# Patient Record
Sex: FEMALE | Race: WHITE | NOT HISPANIC OR LATINO | Employment: FULL TIME | URBAN - METROPOLITAN AREA
[De-identification: names, ages, dates, MRNs, and addresses within clinical notes are randomized per-mention and may not be internally consistent; named-entity substitution may affect disease eponyms.]

---

## 2017-05-02 ENCOUNTER — GENERIC CONVERSION - ENCOUNTER (OUTPATIENT)
Dept: OTHER | Facility: OTHER | Age: 57
End: 2017-05-02

## 2017-05-02 ENCOUNTER — TRANSCRIBE ORDERS (OUTPATIENT)
Dept: SLEEP CENTER | Facility: CLINIC | Age: 57
End: 2017-05-02

## 2017-05-02 DIAGNOSIS — G47.33 OBSTRUCTIVE SLEEP APNEA (ADULT) (PEDIATRIC): Primary | ICD-10-CM

## 2017-05-12 ENCOUNTER — HOSPITAL ENCOUNTER (OUTPATIENT)
Dept: SLEEP CENTER | Facility: CLINIC | Age: 57
Discharge: HOME | End: 2017-05-12
Payer: COMMERCIAL

## 2017-05-12 DIAGNOSIS — G47.33 OBSTRUCTIVE SLEEP APNEA (ADULT) (PEDIATRIC): ICD-10-CM

## 2017-06-02 ENCOUNTER — TRANSCRIBE ORDERS (OUTPATIENT)
Dept: SLEEP CENTER | Facility: CLINIC | Age: 57
End: 2017-06-02

## 2017-06-02 ENCOUNTER — TRANSCRIBE ORDERS (OUTPATIENT)
Dept: ADMINISTRATIVE | Facility: HOSPITAL | Age: 57
End: 2017-06-02

## 2017-06-02 ENCOUNTER — ALLSCRIPTS OFFICE VISIT (OUTPATIENT)
Dept: OTHER | Facility: OTHER | Age: 57
End: 2017-06-02

## 2017-06-02 DIAGNOSIS — E66.01 MORBID (SEVERE) OBESITY DUE TO EXCESS CALORIES (HCC): ICD-10-CM

## 2017-06-02 DIAGNOSIS — E66.01 MORBID OBESITY, UNSPECIFIED OBESITY TYPE (HCC): Primary | ICD-10-CM

## 2017-06-03 ENCOUNTER — TRANSCRIBE ORDERS (OUTPATIENT)
Dept: ADMINISTRATIVE | Facility: HOSPITAL | Age: 57
End: 2017-06-03

## 2017-06-03 ENCOUNTER — APPOINTMENT (OUTPATIENT)
Dept: LAB | Facility: MEDICAL CENTER | Age: 57
End: 2017-06-03
Payer: COMMERCIAL

## 2017-06-03 DIAGNOSIS — E55.9 VITAMIN D DEFICIENCY: ICD-10-CM

## 2017-06-03 DIAGNOSIS — E66.01 MORBID OBESITY, UNSPECIFIED OBESITY TYPE (HCC): Primary | ICD-10-CM

## 2017-06-03 DIAGNOSIS — E03.9 HYPOTHYROIDISM, UNSPECIFIED TYPE: Primary | ICD-10-CM

## 2017-06-03 LAB
25(OH)D3 SERPL-MCNC: 31.6 NG/ML (ref 30–100)
BUN SERPL-MCNC: 20 MG/DL (ref 5–25)
CREAT SERPL-MCNC: 0.72 MG/DL (ref 0.6–1.3)
GFR SERPL CREATININE-BSD FRML MDRD: >60 ML/MIN/1.73SQ M
TSH SERPL DL<=0.05 MIU/L-ACNC: 0.61 UIU/ML (ref 0.36–3.74)

## 2017-06-03 PROCEDURE — 82306 VITAMIN D 25 HYDROXY: CPT | Performed by: FAMILY MEDICINE

## 2017-06-03 PROCEDURE — 82565 ASSAY OF CREATININE: CPT | Performed by: SURGERY

## 2017-06-03 PROCEDURE — 84520 ASSAY OF UREA NITROGEN: CPT | Performed by: SURGERY

## 2017-06-03 PROCEDURE — 84443 ASSAY THYROID STIM HORMONE: CPT | Performed by: FAMILY MEDICINE

## 2017-06-03 PROCEDURE — 36415 COLL VENOUS BLD VENIPUNCTURE: CPT | Performed by: SURGERY

## 2017-06-06 ENCOUNTER — GENERIC CONVERSION - ENCOUNTER (OUTPATIENT)
Dept: OTHER | Facility: OTHER | Age: 57
End: 2017-06-06

## 2017-06-08 ENCOUNTER — HOSPITAL ENCOUNTER (OUTPATIENT)
Dept: RADIOLOGY | Facility: MEDICAL CENTER | Age: 57
Discharge: HOME | End: 2017-06-08
Payer: COMMERCIAL

## 2017-06-08 DIAGNOSIS — E66.01 MORBID (SEVERE) OBESITY DUE TO EXCESS CALORIES (HCC): ICD-10-CM

## 2017-06-08 PROCEDURE — 74177 CT ABD & PELVIS W/CONTRAST: CPT

## 2017-06-08 RX ADMIN — IOHEXOL 100 ML: 350 INJECTION, SOLUTION INTRAVENOUS at 15:03

## 2017-06-14 ENCOUNTER — GENERIC CONVERSION - ENCOUNTER (OUTPATIENT)
Dept: OTHER | Facility: OTHER | Age: 57
End: 2017-06-14

## 2017-06-15 ENCOUNTER — GENERIC CONVERSION - ENCOUNTER (OUTPATIENT)
Dept: OTHER | Facility: OTHER | Age: 57
End: 2017-06-15

## 2017-06-20 ENCOUNTER — HOSPITAL ENCOUNTER (OUTPATIENT)
Dept: SLEEP CENTER | Facility: CLINIC | Age: 57
Discharge: HOME | End: 2017-06-20
Payer: COMMERCIAL

## 2017-06-20 DIAGNOSIS — G47.33 OBSTRUCTIVE SLEEP APNEA (ADULT) (PEDIATRIC): ICD-10-CM

## 2017-06-20 PROCEDURE — 95811 POLYSOM 6/>YRS CPAP 4/> PARM: CPT

## 2017-06-22 ENCOUNTER — GENERIC CONVERSION - ENCOUNTER (OUTPATIENT)
Dept: OTHER | Facility: OTHER | Age: 57
End: 2017-06-22

## 2017-07-06 ENCOUNTER — GENERIC CONVERSION - ENCOUNTER (OUTPATIENT)
Dept: OTHER | Facility: OTHER | Age: 57
End: 2017-07-06

## 2017-07-21 ENCOUNTER — HOSPITAL ENCOUNTER (OUTPATIENT)
Dept: SLEEP CENTER | Facility: CLINIC | Age: 57
Discharge: HOME | End: 2017-07-21
Payer: COMMERCIAL

## 2017-07-21 DIAGNOSIS — G47.33 OBSTRUCTIVE SLEEP APNEA (ADULT) (PEDIATRIC): ICD-10-CM

## 2017-07-31 ENCOUNTER — GENERIC CONVERSION - ENCOUNTER (OUTPATIENT)
Dept: OTHER | Facility: OTHER | Age: 57
End: 2017-07-31

## 2017-07-31 DIAGNOSIS — I10 ESSENTIAL (PRIMARY) HYPERTENSION: ICD-10-CM

## 2017-07-31 DIAGNOSIS — I48.91 ATRIAL FIBRILLATION (HCC): ICD-10-CM

## 2017-07-31 DIAGNOSIS — Z86.39 PERSONAL HISTORY OF OTHER ENDOCRINE, NUTRITIONAL AND METABOLIC DISEASE: ICD-10-CM

## 2017-07-31 DIAGNOSIS — I49.9 CARDIAC ARRHYTHMIA: ICD-10-CM

## 2017-08-02 ENCOUNTER — ANESTHESIA EVENT (OUTPATIENT)
Dept: PERIOP | Facility: HOSPITAL | Age: 57
DRG: 621 | End: 2017-08-02
Payer: COMMERCIAL

## 2017-08-03 ENCOUNTER — ALLSCRIPTS OFFICE VISIT (OUTPATIENT)
Dept: OTHER | Facility: OTHER | Age: 57
End: 2017-08-03

## 2017-08-08 ENCOUNTER — TRANSCRIBE ORDERS (OUTPATIENT)
Dept: LAB | Age: 57
End: 2017-08-08

## 2017-08-08 ENCOUNTER — APPOINTMENT (OUTPATIENT)
Dept: LAB | Age: 57
End: 2017-08-08
Payer: COMMERCIAL

## 2017-08-08 DIAGNOSIS — I48.91 ATRIAL FIBRILLATION (HCC): ICD-10-CM

## 2017-08-08 DIAGNOSIS — Z86.39 PERSONAL HISTORY OF NUTRITIONAL DEFICIENCY: ICD-10-CM

## 2017-08-08 DIAGNOSIS — I49.9 VENTRICULAR ARRHYTHMIA: ICD-10-CM

## 2017-08-08 DIAGNOSIS — I10 ESSENTIAL HYPERTENSION, MALIGNANT: ICD-10-CM

## 2017-08-08 DIAGNOSIS — I48.91 ATRIAL FIBRILLATION, UNSPECIFIED TYPE (HCC): Primary | ICD-10-CM

## 2017-08-08 DIAGNOSIS — I10 ESSENTIAL (PRIMARY) HYPERTENSION: ICD-10-CM

## 2017-08-08 DIAGNOSIS — E66.01 MORBID OBESITY DUE TO EXCESS CALORIES (HCC): ICD-10-CM

## 2017-08-08 DIAGNOSIS — I49.9 CARDIAC ARRHYTHMIA: ICD-10-CM

## 2017-08-08 DIAGNOSIS — Z86.39 PERSONAL HISTORY OF OTHER ENDOCRINE, NUTRITIONAL AND METABOLIC DISEASE: ICD-10-CM

## 2017-08-08 LAB
ALBUMIN SERPL BCP-MCNC: 3.3 G/DL (ref 3.5–5)
ALP SERPL-CCNC: 70 U/L (ref 46–116)
ALT SERPL W P-5'-P-CCNC: 19 U/L (ref 12–78)
ANION GAP SERPL CALCULATED.3IONS-SCNC: 6 MMOL/L (ref 4–13)
AST SERPL W P-5'-P-CCNC: 18 U/L (ref 5–45)
BILIRUB SERPL-MCNC: 0.9 MG/DL (ref 0.2–1)
BUN SERPL-MCNC: 16 MG/DL (ref 5–25)
CALCIUM SERPL-MCNC: 9.6 MG/DL (ref 8.3–10.1)
CHLORIDE SERPL-SCNC: 102 MMOL/L (ref 100–108)
CHOLEST SERPL-MCNC: 180 MG/DL (ref 50–200)
CO2 SERPL-SCNC: 29 MMOL/L (ref 21–32)
CREAT SERPL-MCNC: 0.57 MG/DL (ref 0.6–1.3)
ERYTHROCYTE [DISTWIDTH] IN BLOOD BY AUTOMATED COUNT: 16.5 % (ref 11.6–15.1)
GFR SERPL CREATININE-BSD FRML MDRD: 105 ML/MIN/1.73SQ M
GLUCOSE P FAST SERPL-MCNC: 97 MG/DL (ref 65–99)
HCT VFR BLD AUTO: 43.9 % (ref 34.8–46.1)
HDLC SERPL-MCNC: 49 MG/DL (ref 40–60)
HGB BLD-MCNC: 13.9 G/DL (ref 11.5–15.4)
LDLC SERPL CALC-MCNC: 111 MG/DL (ref 0–100)
MCH RBC QN AUTO: 26.4 PG (ref 26.8–34.3)
MCHC RBC AUTO-ENTMCNC: 31.7 G/DL (ref 31.4–37.4)
MCV RBC AUTO: 84 FL (ref 82–98)
PLATELET # BLD AUTO: 215 THOUSANDS/UL (ref 149–390)
PMV BLD AUTO: 12.1 FL (ref 8.9–12.7)
POTASSIUM SERPL-SCNC: 4.6 MMOL/L (ref 3.5–5.3)
PROT SERPL-MCNC: 8.2 G/DL (ref 6.4–8.2)
RBC # BLD AUTO: 5.26 MILLION/UL (ref 3.81–5.12)
SODIUM SERPL-SCNC: 137 MMOL/L (ref 136–145)
TRIGL SERPL-MCNC: 98 MG/DL
TSH SERPL DL<=0.05 MIU/L-ACNC: 1.62 UIU/ML (ref 0.36–3.74)
WBC # BLD AUTO: 6.98 THOUSAND/UL (ref 4.31–10.16)

## 2017-08-08 PROCEDURE — 85027 COMPLETE CBC AUTOMATED: CPT

## 2017-08-08 PROCEDURE — 80061 LIPID PANEL: CPT

## 2017-08-08 PROCEDURE — 80053 COMPREHEN METABOLIC PANEL: CPT

## 2017-08-08 PROCEDURE — 36415 COLL VENOUS BLD VENIPUNCTURE: CPT

## 2017-08-08 PROCEDURE — 84443 ASSAY THYROID STIM HORMONE: CPT

## 2017-08-09 ENCOUNTER — GENERIC CONVERSION - ENCOUNTER (OUTPATIENT)
Dept: OTHER | Facility: OTHER | Age: 57
End: 2017-08-09

## 2017-08-14 ENCOUNTER — GENERIC CONVERSION - ENCOUNTER (OUTPATIENT)
Dept: OTHER | Facility: OTHER | Age: 57
End: 2017-08-14

## 2017-08-15 ENCOUNTER — GENERIC CONVERSION - ENCOUNTER (OUTPATIENT)
Dept: OTHER | Facility: OTHER | Age: 57
End: 2017-08-15

## 2017-08-21 ENCOUNTER — HOSPITAL ENCOUNTER (INPATIENT)
Facility: HOSPITAL | Age: 57
LOS: 2 days | Discharge: HOME | DRG: 621 | End: 2017-08-23
Attending: SURGERY | Admitting: SURGERY
Payer: COMMERCIAL

## 2017-08-21 ENCOUNTER — ANESTHESIA (OUTPATIENT)
Dept: PERIOP | Facility: HOSPITAL | Age: 57
DRG: 621 | End: 2017-08-21
Payer: COMMERCIAL

## 2017-08-21 DIAGNOSIS — I10 ESSENTIAL (PRIMARY) HYPERTENSION: ICD-10-CM

## 2017-08-21 DIAGNOSIS — E66.01 MORBID (SEVERE) OBESITY DUE TO EXCESS CALORIES (HCC): ICD-10-CM

## 2017-08-21 PROCEDURE — 0DB64Z3 EXCISION OF STOMACH, PERCUTANEOUS ENDOSCOPIC APPROACH, VERTICAL: ICD-10-PCS | Performed by: SURGERY

## 2017-08-21 PROCEDURE — C9113 INJ PANTOPRAZOLE SODIUM, VIA: HCPCS | Performed by: SURGERY

## 2017-08-21 PROCEDURE — 0DJ08ZZ INSPECTION OF UPPER INTESTINAL TRACT, VIA NATURAL OR ARTIFICIAL OPENING ENDOSCOPIC: ICD-10-PCS | Performed by: SURGERY

## 2017-08-21 PROCEDURE — 88307 TISSUE EXAM BY PATHOLOGIST: CPT | Performed by: SURGERY

## 2017-08-21 RX ORDER — PROMETHAZINE HYDROCHLORIDE 25 MG/ML
25 INJECTION, SOLUTION INTRAMUSCULAR; INTRAVENOUS EVERY 6 HOURS PRN
Status: DISCONTINUED | OUTPATIENT
Start: 2017-08-21 | End: 2017-08-23 | Stop reason: HOSPADM

## 2017-08-21 RX ORDER — EPHEDRINE SULFATE 50 MG/ML
INJECTION, SOLUTION INTRAVENOUS AS NEEDED
Status: DISCONTINUED | OUTPATIENT
Start: 2017-08-21 | End: 2017-08-21 | Stop reason: SURG

## 2017-08-21 RX ORDER — PROPOFOL 10 MG/ML
INJECTION, EMULSION INTRAVENOUS AS NEEDED
Status: DISCONTINUED | OUTPATIENT
Start: 2017-08-21 | End: 2017-08-21 | Stop reason: SURG

## 2017-08-21 RX ORDER — MEPERIDINE HYDROCHLORIDE 50 MG/ML
12.5 INJECTION INTRAMUSCULAR; INTRAVENOUS; SUBCUTANEOUS ONCE AS NEEDED
Status: DISCONTINUED | OUTPATIENT
Start: 2017-08-21 | End: 2017-08-21 | Stop reason: HOSPADM

## 2017-08-21 RX ORDER — PANTOPRAZOLE SODIUM 40 MG/1
40 INJECTION, POWDER, FOR SOLUTION INTRAVENOUS
Status: DISCONTINUED | OUTPATIENT
Start: 2017-08-21 | End: 2017-08-23 | Stop reason: HOSPADM

## 2017-08-21 RX ORDER — OXYCODONE HCL 5 MG/5 ML
10 SOLUTION, ORAL ORAL EVERY 4 HOURS PRN
Status: DISCONTINUED | OUTPATIENT
Start: 2017-08-21 | End: 2017-08-23 | Stop reason: HOSPADM

## 2017-08-21 RX ORDER — MAGNESIUM HYDROXIDE 1200 MG/15ML
LIQUID ORAL AS NEEDED
Status: DISCONTINUED | OUTPATIENT
Start: 2017-08-21 | End: 2017-08-21 | Stop reason: HOSPADM

## 2017-08-21 RX ORDER — MORPHINE SULFATE 4 MG/ML
4 INJECTION, SOLUTION INTRAMUSCULAR; INTRAVENOUS EVERY 2 HOUR PRN
Status: DISCONTINUED | OUTPATIENT
Start: 2017-08-21 | End: 2017-08-23 | Stop reason: HOSPADM

## 2017-08-21 RX ORDER — ACETAMINOPHEN 160 MG/5ML
325 SUSPENSION, ORAL (FINAL DOSE FORM) ORAL EVERY 4 HOURS PRN
Status: DISCONTINUED | OUTPATIENT
Start: 2017-08-21 | End: 2017-08-23 | Stop reason: HOSPADM

## 2017-08-21 RX ORDER — BUPIVACAINE HYDROCHLORIDE AND EPINEPHRINE 5; 5 MG/ML; UG/ML
INJECTION, SOLUTION PERINEURAL AS NEEDED
Status: DISCONTINUED | OUTPATIENT
Start: 2017-08-21 | End: 2017-08-21 | Stop reason: HOSPADM

## 2017-08-21 RX ORDER — ACETAMINOPHEN 160 MG/5ML
320 SUSPENSION, ORAL (FINAL DOSE FORM) ORAL EVERY 4 HOURS PRN
Status: DISCONTINUED | OUTPATIENT
Start: 2017-08-21 | End: 2017-08-23 | Stop reason: HOSPADM

## 2017-08-21 RX ORDER — SCOLOPAMINE TRANSDERMAL SYSTEM 1 MG/1
1 PATCH, EXTENDED RELEASE TRANSDERMAL ONCE AS NEEDED
Status: DISCONTINUED | OUTPATIENT
Start: 2017-08-21 | End: 2017-08-21

## 2017-08-21 RX ORDER — SODIUM CHLORIDE 9 MG/ML
125 INJECTION, SOLUTION INTRAVENOUS CONTINUOUS
Status: DISCONTINUED | OUTPATIENT
Start: 2017-08-21 | End: 2017-08-21 | Stop reason: HOSPADM

## 2017-08-21 RX ORDER — ONDANSETRON 2 MG/ML
INJECTION INTRAMUSCULAR; INTRAVENOUS AS NEEDED
Status: DISCONTINUED | OUTPATIENT
Start: 2017-08-21 | End: 2017-08-21 | Stop reason: SURG

## 2017-08-21 RX ORDER — GLYCOPYRROLATE 0.2 MG/ML
INJECTION INTRAMUSCULAR; INTRAVENOUS AS NEEDED
Status: DISCONTINUED | OUTPATIENT
Start: 2017-08-21 | End: 2017-08-21 | Stop reason: SURG

## 2017-08-21 RX ORDER — FENTANYL CITRATE/PF 50 MCG/ML
50 SYRINGE (ML) INJECTION
Status: DISCONTINUED | OUTPATIENT
Start: 2017-08-21 | End: 2017-08-21 | Stop reason: HOSPADM

## 2017-08-21 RX ORDER — DOCUSATE SODIUM 100 MG/1
100 CAPSULE, LIQUID FILLED ORAL 2 TIMES DAILY
COMMUNITY
End: 2018-04-16

## 2017-08-21 RX ORDER — MORPHINE SULFATE 4 MG/ML
2 INJECTION, SOLUTION INTRAMUSCULAR; INTRAVENOUS EVERY 2 HOUR PRN
Status: DISCONTINUED | OUTPATIENT
Start: 2017-08-21 | End: 2017-08-23 | Stop reason: HOSPADM

## 2017-08-21 RX ORDER — SODIUM CHLORIDE, SODIUM LACTATE, POTASSIUM CHLORIDE, CALCIUM CHLORIDE 600; 310; 30; 20 MG/100ML; MG/100ML; MG/100ML; MG/100ML
50 INJECTION, SOLUTION INTRAVENOUS CONTINUOUS
Status: DISCONTINUED | OUTPATIENT
Start: 2017-08-21 | End: 2017-08-23

## 2017-08-21 RX ORDER — ROCURONIUM BROMIDE 10 MG/ML
INJECTION, SOLUTION INTRAVENOUS AS NEEDED
Status: DISCONTINUED | OUTPATIENT
Start: 2017-08-21 | End: 2017-08-21 | Stop reason: SURG

## 2017-08-21 RX ORDER — ONDANSETRON 2 MG/ML
4 INJECTION INTRAMUSCULAR; INTRAVENOUS EVERY 4 HOURS PRN
Status: DISCONTINUED | OUTPATIENT
Start: 2017-08-21 | End: 2017-08-23 | Stop reason: HOSPADM

## 2017-08-21 RX ORDER — MIDAZOLAM HYDROCHLORIDE 1 MG/ML
INJECTION INTRAMUSCULAR; INTRAVENOUS AS NEEDED
Status: DISCONTINUED | OUTPATIENT
Start: 2017-08-21 | End: 2017-08-21 | Stop reason: SURG

## 2017-08-21 RX ORDER — OXYCODONE HCL 5 MG/5 ML
5 SOLUTION, ORAL ORAL EVERY 4 HOURS PRN
Status: DISCONTINUED | OUTPATIENT
Start: 2017-08-21 | End: 2017-08-23 | Stop reason: HOSPADM

## 2017-08-21 RX ORDER — ONDANSETRON 2 MG/ML
4 INJECTION INTRAMUSCULAR; INTRAVENOUS ONCE AS NEEDED
Status: DISCONTINUED | OUTPATIENT
Start: 2017-08-21 | End: 2017-08-21 | Stop reason: HOSPADM

## 2017-08-21 RX ORDER — FENTANYL CITRATE 50 UG/ML
INJECTION, SOLUTION INTRAMUSCULAR; INTRAVENOUS AS NEEDED
Status: DISCONTINUED | OUTPATIENT
Start: 2017-08-21 | End: 2017-08-21 | Stop reason: SURG

## 2017-08-21 RX ORDER — METOPROLOL TARTRATE 5 MG/5ML
5 INJECTION INTRAVENOUS EVERY 6 HOURS
Status: DISCONTINUED | OUTPATIENT
Start: 2017-08-21 | End: 2017-08-23 | Stop reason: HOSPADM

## 2017-08-21 RX ORDER — HYDROMORPHONE HYDROCHLORIDE 2 MG/ML
INJECTION, SOLUTION INTRAMUSCULAR; INTRAVENOUS; SUBCUTANEOUS AS NEEDED
Status: DISCONTINUED | OUTPATIENT
Start: 2017-08-21 | End: 2017-08-21 | Stop reason: SURG

## 2017-08-21 RX ADMIN — ONDANSETRON HYDROCHLORIDE 4 MG: 2 INJECTION, SOLUTION INTRAVENOUS at 13:15

## 2017-08-21 RX ADMIN — SODIUM CHLORIDE, SODIUM LACTATE, POTASSIUM CHLORIDE, AND CALCIUM CHLORIDE 125 ML/HR: .6; .31; .03; .02 INJECTION, SOLUTION INTRAVENOUS at 14:18

## 2017-08-21 RX ADMIN — OXYCODONE HYDROCHLORIDE 10 MG: 5 SOLUTION ORAL at 21:58

## 2017-08-21 RX ADMIN — FENTANYL CITRATE 50 MCG: 50 INJECTION, SOLUTION INTRAMUSCULAR; INTRAVENOUS at 12:50

## 2017-08-21 RX ADMIN — ROCURONIUM BROMIDE 10 MG: 10 INJECTION, SOLUTION INTRAVENOUS at 13:04

## 2017-08-21 RX ADMIN — CEFAZOLIN SODIUM 3000 MG: 2 SOLUTION INTRAVENOUS at 12:23

## 2017-08-21 RX ADMIN — SODIUM CHLORIDE, SODIUM LACTATE, POTASSIUM CHLORIDE, AND CALCIUM CHLORIDE 125 ML/HR: .6; .31; .03; .02 INJECTION, SOLUTION INTRAVENOUS at 17:38

## 2017-08-21 RX ADMIN — MIDAZOLAM HYDROCHLORIDE 2 MG: 1 INJECTION, SOLUTION INTRAMUSCULAR; INTRAVENOUS at 12:00

## 2017-08-21 RX ADMIN — SODIUM CHLORIDE 125 ML/HR: 0.9 INJECTION, SOLUTION INTRAVENOUS at 10:18

## 2017-08-21 RX ADMIN — FENTANYL CITRATE 100 MCG: 50 INJECTION, SOLUTION INTRAMUSCULAR; INTRAVENOUS at 12:09

## 2017-08-21 RX ADMIN — ONDANSETRON 4 MG: 2 INJECTION INTRAMUSCULAR; INTRAVENOUS at 16:55

## 2017-08-21 RX ADMIN — FENTANYL CITRATE 50 MCG: 50 INJECTION INTRAMUSCULAR; INTRAVENOUS at 14:29

## 2017-08-21 RX ADMIN — METOPROLOL TARTRATE 5 MG: 5 INJECTION INTRAVENOUS at 16:54

## 2017-08-21 RX ADMIN — EPHEDRINE SULFATE 5 MG: 50 INJECTION, SOLUTION INTRAMUSCULAR; INTRAVENOUS; SUBCUTANEOUS at 12:42

## 2017-08-21 RX ADMIN — SCOPALAMINE 1 PATCH: 1 PATCH, EXTENDED RELEASE TRANSDERMAL at 10:20

## 2017-08-21 RX ADMIN — DEXAMETHASONE SODIUM PHOSPHATE 4 MG: 10 INJECTION INTRAMUSCULAR; INTRAVENOUS at 12:47

## 2017-08-21 RX ADMIN — HYDROMORPHONE HYDROCHLORIDE 1 MG: 2 INJECTION, SOLUTION INTRAMUSCULAR; INTRAVENOUS; SUBCUTANEOUS at 13:25

## 2017-08-21 RX ADMIN — PROPOFOL 200 MG: 10 INJECTION, EMULSION INTRAVENOUS at 12:09

## 2017-08-21 RX ADMIN — ENOXAPARIN SODIUM 40 MG: 40 INJECTION SUBCUTANEOUS at 11:35

## 2017-08-21 RX ADMIN — ACETAMINOPHEN 325 MG: 160 SUSPENSION ORAL at 21:59

## 2017-08-21 RX ADMIN — ONDANSETRON HYDROCHLORIDE 4 MG: 2 INJECTION, SOLUTION INTRAVENOUS at 12:57

## 2017-08-21 RX ADMIN — EPHEDRINE SULFATE 10 MG: 50 INJECTION, SOLUTION INTRAMUSCULAR; INTRAVENOUS; SUBCUTANEOUS at 12:14

## 2017-08-21 RX ADMIN — HYDROMORPHONE HYDROCHLORIDE 1 MG: 2 INJECTION, SOLUTION INTRAMUSCULAR; INTRAVENOUS; SUBCUTANEOUS at 13:40

## 2017-08-21 RX ADMIN — ROCURONIUM BROMIDE 50 MG: 10 INJECTION, SOLUTION INTRAVENOUS at 12:09

## 2017-08-21 RX ADMIN — GLYCOPYRROLATE 0.4 MG: 0.2 INJECTION, SOLUTION INTRAMUSCULAR; INTRAVENOUS at 13:29

## 2017-08-21 RX ADMIN — NEOSTIGMINE METHYLSULFATE 2.5 MG: 1 INJECTION, SOLUTION INTRAMUSCULAR; INTRAVENOUS; SUBCUTANEOUS at 13:29

## 2017-08-21 RX ADMIN — METOPROLOL TARTRATE 5 MG: 5 INJECTION INTRAVENOUS at 21:52

## 2017-08-21 RX ADMIN — PANTOPRAZOLE SODIUM 40 MG: 40 INJECTION, POWDER, FOR SOLUTION INTRAVENOUS at 16:55

## 2017-08-21 RX ADMIN — FENTANYL CITRATE 50 MCG: 50 INJECTION, SOLUTION INTRAMUSCULAR; INTRAVENOUS at 13:15

## 2017-08-22 LAB
ANION GAP SERPL CALCULATED.3IONS-SCNC: 12 MMOL/L (ref 4–13)
BUN SERPL-MCNC: 13 MG/DL (ref 5–25)
CALCIUM SERPL-MCNC: 9.1 MG/DL (ref 8.3–10.1)
CHLORIDE SERPL-SCNC: 100 MMOL/L (ref 100–108)
CO2 SERPL-SCNC: 26 MMOL/L (ref 21–32)
CREAT SERPL-MCNC: 0.75 MG/DL (ref 0.6–1.3)
ERYTHROCYTE [DISTWIDTH] IN BLOOD BY AUTOMATED COUNT: 16.7 % (ref 11.6–15.1)
GFR SERPL CREATININE-BSD FRML MDRD: 91 ML/MIN/1.73SQ M
GLUCOSE SERPL-MCNC: 104 MG/DL (ref 65–140)
HCT VFR BLD AUTO: 42.9 % (ref 34.8–46.1)
HGB BLD-MCNC: 14 G/DL (ref 11.5–15.4)
MCH RBC QN AUTO: 27.8 PG (ref 26.8–34.3)
MCHC RBC AUTO-ENTMCNC: 32.6 G/DL (ref 31.4–37.4)
MCV RBC AUTO: 85 FL (ref 82–98)
PLATELET # BLD AUTO: 214 THOUSANDS/UL (ref 149–390)
PMV BLD AUTO: 12.9 FL (ref 8.9–12.7)
POTASSIUM SERPL-SCNC: 4.4 MMOL/L (ref 3.5–5.3)
RBC # BLD AUTO: 5.04 MILLION/UL (ref 3.81–5.12)
SODIUM SERPL-SCNC: 138 MMOL/L (ref 136–145)
WBC # BLD AUTO: 8.7 THOUSAND/UL (ref 4.31–10.16)

## 2017-08-22 PROCEDURE — C9113 INJ PANTOPRAZOLE SODIUM, VIA: HCPCS | Performed by: SURGERY

## 2017-08-22 PROCEDURE — 85027 COMPLETE CBC AUTOMATED: CPT | Performed by: SURGERY

## 2017-08-22 PROCEDURE — 80048 BASIC METABOLIC PNL TOTAL CA: CPT | Performed by: SURGERY

## 2017-08-22 RX ORDER — METOCLOPRAMIDE HYDROCHLORIDE 5 MG/ML
10 INJECTION INTRAMUSCULAR; INTRAVENOUS EVERY 6 HOURS SCHEDULED
Status: DISCONTINUED | OUTPATIENT
Start: 2017-08-22 | End: 2017-08-23 | Stop reason: HOSPADM

## 2017-08-22 RX ORDER — METOCLOPRAMIDE HYDROCHLORIDE 5 MG/ML
5 INJECTION INTRAMUSCULAR; INTRAVENOUS EVERY 6 HOURS SCHEDULED
Status: DISCONTINUED | OUTPATIENT
Start: 2017-08-22 | End: 2017-08-22

## 2017-08-22 RX ADMIN — ACETAMINOPHEN 325 MG: 160 SUSPENSION ORAL at 02:30

## 2017-08-22 RX ADMIN — SODIUM CHLORIDE, SODIUM LACTATE, POTASSIUM CHLORIDE, AND CALCIUM CHLORIDE 50 ML/HR: .6; .31; .03; .02 INJECTION, SOLUTION INTRAVENOUS at 10:46

## 2017-08-22 RX ADMIN — OXYCODONE HYDROCHLORIDE 10 MG: 5 SOLUTION ORAL at 02:29

## 2017-08-22 RX ADMIN — SODIUM CHLORIDE, SODIUM LACTATE, POTASSIUM CHLORIDE, AND CALCIUM CHLORIDE 125 ML/HR: .6; .31; .03; .02 INJECTION, SOLUTION INTRAVENOUS at 00:59

## 2017-08-22 RX ADMIN — METOPROLOL TARTRATE 5 MG: 5 INJECTION INTRAVENOUS at 16:45

## 2017-08-22 RX ADMIN — ONDANSETRON 4 MG: 2 INJECTION INTRAMUSCULAR; INTRAVENOUS at 00:48

## 2017-08-22 RX ADMIN — OXYCODONE HYDROCHLORIDE 10 MG: 5 SOLUTION ORAL at 08:12

## 2017-08-22 RX ADMIN — ACETAMINOPHEN 325 MG: 160 SUSPENSION ORAL at 08:12

## 2017-08-22 RX ADMIN — ENOXAPARIN SODIUM 40 MG: 40 INJECTION SUBCUTANEOUS at 08:16

## 2017-08-22 RX ADMIN — METOPROLOL TARTRATE 5 MG: 5 INJECTION INTRAVENOUS at 08:15

## 2017-08-22 RX ADMIN — ONDANSETRON 4 MG: 2 INJECTION INTRAMUSCULAR; INTRAVENOUS at 16:45

## 2017-08-22 RX ADMIN — METOCLOPRAMIDE HYDROCHLORIDE 10 MG: 5 INJECTION INTRAMUSCULAR; INTRAVENOUS at 17:58

## 2017-08-22 RX ADMIN — PANTOPRAZOLE SODIUM 40 MG: 40 INJECTION, POWDER, FOR SOLUTION INTRAVENOUS at 08:14

## 2017-08-22 RX ADMIN — METOCLOPRAMIDE HYDROCHLORIDE 10 MG: 5 INJECTION INTRAMUSCULAR; INTRAVENOUS at 23:15

## 2017-08-22 RX ADMIN — METOCLOPRAMIDE 5 MG: 5 INJECTION, SOLUTION INTRAMUSCULAR; INTRAVENOUS at 11:34

## 2017-08-22 RX ADMIN — METOPROLOL TARTRATE 5 MG: 5 INJECTION INTRAVENOUS at 21:32

## 2017-08-22 RX ADMIN — METOPROLOL TARTRATE 5 MG: 5 INJECTION INTRAVENOUS at 03:09

## 2017-08-22 RX ADMIN — METOCLOPRAMIDE 5 MG: 5 INJECTION, SOLUTION INTRAMUSCULAR; INTRAVENOUS at 08:00

## 2017-08-23 VITALS
HEIGHT: 62 IN | WEIGHT: 292.8 LBS | SYSTOLIC BLOOD PRESSURE: 141 MMHG | BODY MASS INDEX: 53.88 KG/M2 | DIASTOLIC BLOOD PRESSURE: 63 MMHG | TEMPERATURE: 97.8 F | OXYGEN SATURATION: 93 % | HEART RATE: 76 BPM | RESPIRATION RATE: 16 BRPM

## 2017-08-23 PROCEDURE — C9113 INJ PANTOPRAZOLE SODIUM, VIA: HCPCS | Performed by: SURGERY

## 2017-08-23 RX ORDER — ONDANSETRON 4 MG/1
4 TABLET, ORALLY DISINTEGRATING ORAL EVERY 6 HOURS PRN
Qty: 10 TABLET | Refills: 0 | Status: SHIPPED | OUTPATIENT
Start: 2017-08-23 | End: 2018-02-01 | Stop reason: ALTCHOICE

## 2017-08-23 RX ADMIN — PANTOPRAZOLE SODIUM 40 MG: 40 INJECTION, POWDER, FOR SOLUTION INTRAVENOUS at 08:29

## 2017-08-23 RX ADMIN — METOCLOPRAMIDE HYDROCHLORIDE 10 MG: 5 INJECTION INTRAMUSCULAR; INTRAVENOUS at 05:11

## 2017-08-23 RX ADMIN — METOPROLOL TARTRATE 5 MG: 5 INJECTION INTRAVENOUS at 03:34

## 2017-08-23 RX ADMIN — ENOXAPARIN SODIUM 40 MG: 40 INJECTION SUBCUTANEOUS at 08:29

## 2017-08-23 RX ADMIN — METOPROLOL TARTRATE 5 MG: 5 INJECTION INTRAVENOUS at 08:28

## 2017-08-23 RX ADMIN — METOCLOPRAMIDE HYDROCHLORIDE 10 MG: 5 INJECTION INTRAMUSCULAR; INTRAVENOUS at 11:10

## 2017-08-23 RX ADMIN — SODIUM CHLORIDE, SODIUM LACTATE, POTASSIUM CHLORIDE, AND CALCIUM CHLORIDE 50 ML/HR: .6; .31; .03; .02 INJECTION, SOLUTION INTRAVENOUS at 06:49

## 2017-08-25 ENCOUNTER — GENERIC CONVERSION - ENCOUNTER (OUTPATIENT)
Dept: OTHER | Facility: OTHER | Age: 57
End: 2017-08-25

## 2017-08-31 ENCOUNTER — GENERIC CONVERSION - ENCOUNTER (OUTPATIENT)
Dept: OTHER | Facility: OTHER | Age: 57
End: 2017-08-31

## 2017-08-31 ENCOUNTER — ALLSCRIPTS OFFICE VISIT (OUTPATIENT)
Dept: OTHER | Facility: OTHER | Age: 57
End: 2017-08-31

## 2017-09-14 ENCOUNTER — GENERIC CONVERSION - ENCOUNTER (OUTPATIENT)
Dept: OTHER | Facility: OTHER | Age: 57
End: 2017-09-14

## 2017-09-22 ENCOUNTER — HOSPITAL ENCOUNTER (OUTPATIENT)
Dept: SLEEP CENTER | Facility: CLINIC | Age: 57
Discharge: HOME | End: 2017-09-22
Payer: COMMERCIAL

## 2017-09-22 DIAGNOSIS — G47.33 OBSTRUCTIVE SLEEP APNEA (ADULT) (PEDIATRIC): ICD-10-CM

## 2017-10-04 ENCOUNTER — GENERIC CONVERSION - ENCOUNTER (OUTPATIENT)
Dept: OTHER | Facility: OTHER | Age: 57
End: 2017-10-04

## 2017-11-30 ENCOUNTER — ALLSCRIPTS OFFICE VISIT (OUTPATIENT)
Dept: OTHER | Facility: OTHER | Age: 57
End: 2017-11-30

## 2017-11-30 DIAGNOSIS — I10 ESSENTIAL (PRIMARY) HYPERTENSION: ICD-10-CM

## 2017-11-30 DIAGNOSIS — Z98.84 BARIATRIC SURGERY STATUS: ICD-10-CM

## 2017-11-30 DIAGNOSIS — K91.2 POSTSURGICAL MALABSORPTION, NOT ELSEWHERE CLASSIFIED (CODE): ICD-10-CM

## 2017-11-30 DIAGNOSIS — E66.01 MORBID (SEVERE) OBESITY DUE TO EXCESS CALORIES (HCC): ICD-10-CM

## 2017-12-01 ENCOUNTER — GENERIC CONVERSION - ENCOUNTER (OUTPATIENT)
Dept: OTHER | Facility: OTHER | Age: 57
End: 2017-12-01

## 2017-12-05 NOTE — PROGRESS NOTES
Assessment    1  Status post laparoscopic sleeve gastrectomy (V45 86) (Z98 84)   2  Postgastrectomy malabsorption (579 3) (K91 2,Z90 3)   3  Benign essential hypertension (401 1) (I10)   4  Morbid obesity (278 01) (E66 01)   5  Cholelithiasis (574 20) (K80 20)   6  Right upper quadrant abdominal pain (789 01) (R10 11)    Plan  Benign essential hypertension, Morbid obesity, Postgastrectomy malabsorption, Status  post laparoscopic sleeve gastrectomy    · (1) CBC/ PLT (NO DIFF); Status:Active; Requested for:30Nov2017;    Perform:[facility]; Due:30Nov2018; Ordered; For:Benign essential hypertension, Morbid obesity, Postgastrectomy malabsorption, Status post laparoscopic sleeve gastrectomy; Ordered By:Halima Keenan;   · (1) COMPREHENSIVE METABOLIC PANEL; Status:Active; Requested for:30Nov2017;    Perform:[facility]; Due:30Nov2018; Ordered; For:Benign essential hypertension, Morbid obesity, Postgastrectomy malabsorption, Status post laparoscopic sleeve gastrectomy; Ordered By:Halima Keenan;   · (1) FERRITIN; Status:Active; Requested for:30Nov2017;    Perform:[facility]; Due:30Nov2018; Ordered; For:Benign essential hypertension, Morbid obesity, Postgastrectomy malabsorption, Status post laparoscopic sleeve gastrectomy; Ordered By:Halima Keenan;   · (1) FOLATE; Status:Active; Requested for:30Nov2017;    Perform:[facility]; Due:30Nov2018; Ordered; For:Benign essential hypertension, Morbid obesity, Postgastrectomy malabsorption, Status post laparoscopic sleeve gastrectomy; Ordered By:Halima Keenan;   · (1) LIPID PANEL, FASTING; Status:Active; Requested for:30Nov2017;    Perform:[facility]; Due:30Nov2018; Ordered; For:Benign essential hypertension, Morbid obesity, Postgastrectomy malabsorption, Status post laparoscopic sleeve gastrectomy; Ordered By:Halima Keenan;   · (1) PTH N-TERMINAL (INTACT); Status:Active;  Requested for:30Nov2017; Perform:[facility]; Due:30Nov2018; Ordered; For:Benign essential hypertension, Morbid obesity, Postgastrectomy malabsorption, Status post laparoscopic sleeve gastrectomy; Ordered By:Halima Keenan;   · (1) VITAMIN A; Status:Active; Requested for:30Nov2017;    Perform:[facility]; Due:30Nov2018; Ordered; For:Benign essential hypertension, Morbid obesity, Postgastrectomy malabsorption, Status post laparoscopic sleeve gastrectomy; Ordered By:Halima Keenan;   · (1) VITAMIN B1, WHOLE BLOOD; Status:Active; Requested for:30Nov2017;    Perform:[facility]; Due:30Nov2018; Ordered; For:Benign essential hypertension, Morbid obesity, Postgastrectomy malabsorption, Status post laparoscopic sleeve gastrectomy; Ordered By:Halima Keenan;   · (1) VITAMIN B12; Status:Active; Requested for:30Nov2017;    Perform:[facility]; Due:30Nov2018; Ordered; For:Benign essential hypertension, Morbid obesity, Postgastrectomy malabsorption, Status post laparoscopic sleeve gastrectomy; Ordered By:Halima Keenan;   · (1) VITAMIN D 25-HYDROXY; Status:Active; Requested for:30Nov2017;    Perform:[facility]; Due:30Nov2018; Ordered; For:Benign essential hypertension, Morbid obesity, Postgastrectomy malabsorption, Status post laparoscopic sleeve gastrectomy; Ordered By:Halima Keenan;    Discussion/Summary    Get ultrasound of gallbladder done [id number] with Dr Tanna Ontiveros to review/ discuss possible gallbladder surgery  Follow up in 3 months for routine visit with me    Follow diet as discussed  Get lab work done prior to your next office visit  Follow vitamin/mineral recommendations as reviewed with you  Exercise as tolerated  Call our office if you have any problems with worsening abdominal pain especially if associated with fever, chills, nausea, vomiting, or any other concerns  1 s/p lap sleeve gastrectomy /morbid obesity/bmi 49 2  RUQ abdominal pain 3  Cholelithiasis    Claudia Cloud is 49year old female Status post laparoscopic sleeve gastrectomy by Dr Pancho Magana 8/21/2017  Her past surgical history was complicated with open cholecystectomy October 2016 for a gangrenous gallbladder  Procedure was noted to be complicated by a broken J P drain which required re-exploration to remove a piece of the j p drain  She also had developed what seemed to be a biloma and had to undergo a percutaneous drain at [facility]    Patient is here for routine follow-up  She is complaining of more frequent right upper quadrant pain/'ache" which occurs after meals-particularly after a higher fat meal which is consistent with biliary dyskinesia  She notes she has this most days of the week pain is relatively mild per patient but can last for several minutes to an hour at a time    she has known cholelithiasis by CT of abdomen/pelvis-with at least one calcified stone and several smaller ones suspected toward the neck    She denies nausea/vomiting fever or chills  she does have + tenderness to palpation to right upper quadrant without rebound or guarding and negative gale's sign      Is eating a regular diet  Is eating at least 60 grams of protein per day  following 30/60 minute rule with liquids  drinking at least 64 ounces of fluid per day  Has had limited exercise secondary to joint pains-encouraged exercise in short intervals a couple times per day as tolerated  I encouraged to review with PCP to see if she needs further evaluation for this or if she would be eligible for PT  She has lost 32% excess body weight loss which is below average but her starting bmi was 60 so expect weight loss to be slower      I advised patient to keep diet low in fat for now  Reviewed her case with Dr Pancho Magana and plan is for her to get Ultrasound of gallbladder to better evaluate duct  She will have follow-up with Dr Pancho Magana after ultrasound is completed    After evaluation of gallbladder I encouraged her to consider follow-up with RD to keep her on track with diet as well    4  Malabsorption- pateint is at risk for malabsorption of vitamins/minerals secondary to malabsorption from her procedure and restriction of intakes  Reviewed current supplements and advised on same-she is taking appropriate bariatric vitamins-due for routine labs-ordering them now    5  HTN-controlled on medication/followed by pcp-will monitor in follow-up visits as well  The patient has the current Goals: Resolve abdominal pain  Continued weight loss with good nutrition intakes  Normal vitamin/mineral levels  Exercise as toleratedThe patent has the current Barriers: Limited exercise with joint pains  Patient is able to Self-Care  Educational resources provided: N/a  Possible side effects of new medications were reviewed with the patient/guardian today  The treatment plan was reviewed with the patient/guardian  The patient/guardian understands and agrees with the treatment plan   She was advised to follow up due to malabsorption risks  Self Referrals: No      Chief Complaint  Patient in office today for 2nd post op visit  She is having issues with abdominal pain and would like to know a more affordable option for vitamins  She is walking  Post-Op  Post-Op Bariatric Surgery:   Tanvi Simpson is status post lap sleeve procedure, performed on 8/21/2017   by Dr Olayinka Nieves  HPI: today's weight is 269 5 lb pounds, today's BMI is 49 and her total weight loss is 32% excess body weight loss pounds  The patient reports no nausea, no vomiting, no constipation, no diarrhea, no chest pain and no abdominal pain  Diet and Exercise: Diet history reviewed and discussed with the patient  Weight loss/gains to date discussed with the patient  Supplements: multivitamins and calcium  PE:   Abdominal exam: soft and no incisional hernia  Assessment:   Post-op, the patient is progressing slowly  Plan:    Activity restrictions: recommend short intevals of walking/exercise as tolerated up to a couple times per day  Instructions / Recommendations: dietary counseling recommended, recommended a daily protein intake of  grams, vitamin supplement(s) recommended, mineral supplement(s) recommended, diet as discussed and instructed to call the office for concerns, questions, or problems  The patient was instructed to follow up in 3 months  Review of Systems    Constitutional: planned weight loss, but no fever and not feeling poorly  Cardiovascular: no chest pain and no palpitations  Respiratory: no shortness of breath and no wheezing  Gastrointestinal: abdominal pain, but no nausea, no vomiting, no constipation and no diarrhea  Musculoskeletal: arthralgias  Psychiatric: no anxiety and no depression  Active Problems    1  Atrial fibrillation (427 31) (I48 91)   2  Benign essential hypertension (401 1) (I10)   3  Bladder infection, acute (595 0) (N30 00)   4  History of underactive thyroid (V12 29) (Z86 39)   5  Hypothyroid (244 9) (E03 9)   6  Irregular heart beat (427 9) (I49 9)   7  Morbid obesity (278 01) (E66 01)   8  Postgastrectomy malabsorption (579 3) (K91 2,Z90 3)   9  Status post laparoscopic sleeve gastrectomy (V45 86) (Z98 84)    Social History    · Never a smoker   · No alcohol use   · No illicit drug use  The social history was reviewed and updated today  Current Meds   1  Calcium Citrate Chewy Bite CHEW;   Therapy: (Recorded:16Fjq9932) to Recorded   2  Coreg 25 MG Oral Tablet; Therapy: (Recorded:15Yvi2094) to Recorded   3  Daily Multiple Vitamins TABS; Therapy: (Recorded:02Jun2017) to Recorded   4  Eliquis 5 MG Oral Tablet; Therapy: (Recorded:97Xvw0870) to Recorded   5  Levothyroxine Sodium 200 MCG Oral Tablet; Therapy: (Recorded:08Rva9538) to Recorded   6  Levothyroxine Sodium 50 MCG Oral Tablet; TAKE 1 TABLET DAILY; Therapy: (Recorded:77Nyv0850) to Recorded   7   Omeprazole 20 MG Oral Capsule Delayed Release; TAKE 1 CAPSULE DAILY EVERY   MORNING BEFORE BREAKFAST; Therapy: 31Duc2545 to (Daniel Nathan)  Requested for: 89Mgz8225; Last   Rx:18Msf7209 Ordered   8  Probiotic Oral Capsule; Therapy: (Recorded:02May2017) to Recorded   9  TraMADol HCl - 50 MG Oral Tablet; Therapy: (Recorded:02May2017) to Recorded    The medication list was reviewed and updated today  Allergies    1  No Known Drug Allergies    Vitals   Recorded: 58SYU2846 09:26AM   Temperature 98 F   Heart Rate 80   Respiration 18   Systolic 500   Diastolic 66   Height 5 ft 2 25 in   Weight 269 lb 8 0 oz   BMI Calculated 48 89   BSA Calculated 2 18     Physical Exam    Constitutional   General appearance: No acute distress, well appearing and well nourished  morbidly obese  Ears, Nose, Mouth, and Throat mucous membranes moist    Pulmonary   Respiratory effort: No increased work of breathing or signs of respiratory distress  Auscultation of lungs: Clear to auscultation  Cardiovascular   Auscultation of heart: Normal rate and rhythm, normal S1 and S2, without murmurs  Abdomen soft, +BS, +tenderness to palpation to Right upper quadrant with no rebound or guarding, negative gale's sign, no incisional hernias appreciated  Musculoskeletal   Gait and station: Normal     Psychiatric   Orientation to person, place, and time: Normal     Mood and affect: Normal          Future Appointments    Date/Time Provider Specialty Site   12/21/2017 02:30 GONZALO Sebastian   General Surgery [facility]   03/07/2018 09:30 AM Juliette Keenan [facility] General Surgery [facility]     Signatures   Electronically signed by : Angela Law [facility]; Nov 30 2017  6:09PM EST                       (Author)

## 2017-12-06 ENCOUNTER — HOSPITAL ENCOUNTER (OUTPATIENT)
Dept: RADIOLOGY | Facility: MEDICAL CENTER | Age: 57
Discharge: HOME | End: 2017-12-06
Payer: COMMERCIAL

## 2017-12-06 DIAGNOSIS — Z98.84 BARIATRIC SURGERY STATUS: ICD-10-CM

## 2017-12-06 PROCEDURE — 76705 ECHO EXAM OF ABDOMEN: CPT

## 2017-12-12 ENCOUNTER — GENERIC CONVERSION - ENCOUNTER (OUTPATIENT)
Dept: OTHER | Facility: OTHER | Age: 57
End: 2017-12-12

## 2017-12-21 ENCOUNTER — ALLSCRIPTS OFFICE VISIT (OUTPATIENT)
Dept: OTHER | Facility: OTHER | Age: 57
End: 2017-12-21

## 2017-12-21 ENCOUNTER — TRANSCRIBE ORDERS (OUTPATIENT)
Dept: ADMINISTRATIVE | Facility: HOSPITAL | Age: 57
End: 2017-12-21

## 2017-12-21 DIAGNOSIS — E66.01 MORBID OBESITY (HCC): Primary | ICD-10-CM

## 2017-12-21 DIAGNOSIS — R10.11 ABDOMINAL PAIN, RIGHT UPPER QUADRANT: ICD-10-CM

## 2017-12-21 DIAGNOSIS — Z98.84 BARIATRIC SURGERY STATUS: ICD-10-CM

## 2017-12-22 NOTE — PROGRESS NOTES
Assessment   1  Cholelithiasis (574 20) (K80 20)   2  Morbid obesity (278 01) (E66 01)    Plan   Morbid obesity, Right upper quadrant abdominal pain, Status post laparoscopic sleeve    gastrectomy    · * MRI ABDOMEN W WO CONTRAST AND MRCP; Status:Need Information - Financial    Authorization,Retrospective By Protocol Authorization; Requested for:[id number];    Perform:[facility]; ZMI:[id number]; Last Updated By:Felipe Serna; 12/21/2017 3:04:23 PM;Ordered; For:Morbid obesity, Right upper quadrant abdominal pain, Status post laparoscopic sleeve gastrectomy; Ordered By:Darius Caballero;    Discussion/Summary      Patient is returning to the office complaining of right upper quadrant pain radiating to the back, her ultrasound showed evidence of gallbladder stones and dilated common bile duct however the patient has history of open cholecystectomy and the exploration because of the LEON drain that apparently broke during the removal  I will start the patient workup by ordering an MRCP  I will see the patient again after the MRCP is performed  Chief Complaint   Patient in office today to discuss gallbladder  Active Problems   1  Atrial fibrillation (427 31) (I48 91)   2  Benign essential hypertension (401 1) (I10)   3  Bladder infection, acute (595 0) (N30 00)   4  Cholelithiasis (574 20) (K80 20)   5  History of underactive thyroid (V12 29) (Z86 39)   6  Hypothyroid (244 9) (E03 9)   7  Irregular heart beat (427 9) (I49 9)   8  Morbid obesity (278 01) (E66 01)   9  Postgastrectomy malabsorption (579 3) (K91 2,Z90 3)   10  Right upper quadrant abdominal pain (789 01) (R10 11)   11  Status post laparoscopic sleeve gastrectomy (V45 86) (Z98 84)    Social History    · Never a smoker   · No alcohol use   · No illicit drug use    Current Meds    1  Calcium Citrate Chewy Bite CHEW;     Therapy: (Recorded:69Gjw9700) to Recorded   2  Coreg 25 MG Oral Tablet; Therapy: (Recorded:64Zqe7180) to Recorded   3   Daily Multiple Vitamins TABS; Therapy: (Recorded:02Jun2017) to Recorded   4  Eliquis 5 MG Oral Tablet; Therapy: (Recorded:78Upx1644) to Recorded   5  Levothyroxine Sodium 200 MCG Oral Tablet; Therapy: (Recorded:31Aug2017) to Recorded   6  Levothyroxine Sodium 50 MCG Oral Tablet; TAKE 1 TABLET DAILY; Therapy: (Recorded:31Aug2017) to Recorded   7  Omeprazole 20 MG Oral Capsule Delayed Release; TAKE 1 CAPSULE DAILY EVERY     MORNING BEFORE BREAKFAST; Therapy: 03Aug2017 to ([employer])  Requested for: 45ZTT2291; Last     Rx:05Dec2017; Status: ACTIVE - Retrospective By Protocol Authorization Ordered   8  Probiotic Oral Capsule; Therapy: (Recorded:02May2017) to Recorded   9  TraMADol HCl - 50 MG Oral Tablet; Therapy: (Recorded:02May2017) to Recorded    Allergies   1  No Known Drug Allergies    Vitals    Recorded: 21Dec2017 02:23PM   Temperature 98 F   Heart Rate 78   Respiration 18   Systolic 197   Diastolic 66   Height 5 ft 2 25 in   Weight 265 lb    BMI Calculated 48 08   BSA Calculated 2 16     Physical Exam        Constitutional      General appearance: No acute distress, well appearing and well nourished  Abdomen      Abdomen: Non-tender, no masses  Liver and spleen: No hepatomegaly or splenomegaly  Future Appointments      Date/Time Provider Specialty Site   03/07/2018 09:30 AM Clari Keenan, [facility] General Surgery [facility]     Signatures    Electronically signed by :  GONZALO Sow ; Dec 21 2017  3:12PM EST                       (Author)

## 2017-12-26 ENCOUNTER — GENERIC CONVERSION - ENCOUNTER (OUTPATIENT)
Dept: OTHER | Facility: OTHER | Age: 57
End: 2017-12-26

## 2018-01-02 ENCOUNTER — GENERIC CONVERSION - ENCOUNTER (OUTPATIENT)
Dept: OTHER | Facility: OTHER | Age: 58
End: 2018-01-02

## 2018-01-09 ENCOUNTER — HOSPITAL ENCOUNTER (OUTPATIENT)
Dept: MRI IMAGING | Facility: HOSPITAL | Age: 58
Discharge: HOME | End: 2018-01-09
Attending: SURGERY
Payer: COMMERCIAL

## 2018-01-09 DIAGNOSIS — R10.11 RIGHT UPPER QUADRANT PAIN: ICD-10-CM

## 2018-01-09 DIAGNOSIS — E66.01 MORBID (SEVERE) OBESITY DUE TO EXCESS CALORIES (HCC): ICD-10-CM

## 2018-01-09 DIAGNOSIS — Z98.84 BARIATRIC SURGERY STATUS: ICD-10-CM

## 2018-01-09 PROCEDURE — A9585 GADOBUTROL INJECTION: HCPCS | Performed by: SURGERY

## 2018-01-09 PROCEDURE — 74183 MRI ABD W/O CNTR FLWD CNTR: CPT

## 2018-01-09 RX ADMIN — GADOBUTROL 11 ML: 604.72 INJECTION INTRAVENOUS at 21:09

## 2018-01-09 NOTE — PROGRESS NOTES
Message  Outreach phone call  No response but left message  How is patient coming along with protein diet? Any? or concerns? Contact information provided> [place]      Active Problems    1  Atrial fibrillation (427 31) (I48 91)   2  Benign essential hypertension (401 1) (I10)   3  History of underactive thyroid (V12 29) (Z86 39)   4  Irregular heart beat (427 9) (I49 9)   5  Morbid obesity (278 01) (E66 01)    Current Meds   1  Coreg 25 MG Oral Tablet (Carvedilol); Therapy: (Recorded:02May2017) to Recorded   2  Daily Multiple Vitamins TABS; Therapy: (Recorded:02Jun2017) to Recorded   3  Eliquis 5 MG Oral Tablet; Therapy: (Recorded:02May2017) to Recorded   4  Levothyroxine Sodium 200 MCG Oral Tablet; Therapy: (Recorded:02May2017) to Recorded   5  Lisinopril 10 MG Oral Tablet; Therapy: (Recorded:02May2017) to Recorded   6  Omeprazole 20 MG Oral Capsule Delayed Release; TAKE 1 CAPSULE DAILY EVERY   MORNING BEFORE BREAKFAST; Therapy: 03Aug2017 to (Pastor Hughes)  Requested for: 08Aug2017; Last   Rx:03Aug2017 Ordered   7  Oxycodone-Acetaminophen 5-325 MG Oral Tablet (Percocet); TAKE 1 TABLET EVERY 4   TO 6 HOURS AS NEEDED FOR PAIN;   Therapy: 01Aug2017 to (Evaluate:06Aug2017); Last Rx:01Aug2017 Ordered   8  Probiotic Oral Capsule; Therapy: (Recorded:02May2017) to Recorded   9  TraMADol HCl - 50 MG Oral Tablet; Therapy: (Recorded:02May2017) to Recorded   10  Vitamin D3 2000 UNIT Oral Capsule; Therapy: (Recorded:02May2017) to Recorded    Allergies    1   No Known Drug Allergies    Signatures   Electronically signed by : DOMINICK Cedeno; Aug 15 2017  2:00PM EST                       (Author)

## 2018-01-10 NOTE — PROGRESS NOTES
Discussion/Summary  Discussion Summary:   Reviewed post-operative pureed and soft foods diet with pt  Discussed gradual diet advancement and portion size progression  Discussed adequate hydration, signs and symptoms of dehydration  Discussed recommended post-operative vitamin supplementation and protein supplements  Discussed importance of regular physical activity  Provided pt with contact information for future questions/concerns  Active Problems    1  Atrial fibrillation (427 31) (I48 91)   2  Benign essential hypertension (401 1) (I10)   3  Bladder infection, acute (595 0) (N30 00)   4  History of underactive thyroid (V12 29) (Z86 39)   5  Hypothyroid (244 9) (E03 9)   6  Irregular heart beat (427 9) (I49 9)   7  Morbid obesity (278 01) (E66 01)   8  Postgastrectomy malabsorption (579 3) (K91 2,Z90 3)   9  Status post laparoscopic sleeve gastrectomy (V45 86) (B20 98)    Past Medical History    1  History of renal calculi (V13 01) (B70 285)    Surgical History    1  History of Cholecystectomy   2  History of Gastrectomy Sleeve    Family History  Mother    1  Family history of    2  Family history of diabetes mellitus (V18 0) (Z83 3)   3  Family history of hypertension (V17 49) (Z82 49)  Father    4  Family history of     Social History    · Never a smoker   · No alcohol use   · No illicit drug use    Current Meds   1  Calcium Citrate Chewy Bite CHEW;   Therapy: (Recorded:88Iwu6467) to Recorded   2  Coreg 25 MG Oral Tablet (Carvedilol); Therapy: (Recorded:66Wbz8032) to Recorded   3  Daily Multiple Vitamins TABS; Therapy: (Recorded:2017) to Recorded   4  Eliquis 5 MG Oral Tablet; Therapy: (Recorded:09Per1756) to Recorded   5  Levothyroxine Sodium 200 MCG Oral Tablet; Therapy: (Recorded:00Sxy5533) to Recorded   6  Levothyroxine Sodium 50 MCG Oral Tablet; TAKE 1 TABLET DAILY; Therapy: (Recorded:19Jwk6679) to Recorded   7  Macrobid CAPS (Nitrofurantoin Monohyd Macro);    Therapy: (Recorded:48Rhn3804) to Recorded   8  Omeprazole 20 MG Oral Capsule Delayed Release; TAKE 1 CAPSULE DAILY EVERY   MORNING BEFORE BREAKFAST; Therapy: 03Aug2017 to (Kendall Charles)  Requested for: 08Aug2017; Last   Rx:03Aug2017 Ordered   9  Probiotic Oral Capsule; Therapy: (Recorded:02May2017) to Recorded   10  TraMADol HCl - 50 MG Oral Tablet; Therapy: (Recorded:02May2017) to Recorded   11  Vitamin D3 2000 UNIT Oral Capsule; Therapy: (Recorded:02May2017) to Recorded    Allergies    1  No Known Drug Allergies    Future Appointments    Date/Time Provider Specialty Site   11/30/2017 09:30 AM Bonnie Jenkins [facility] General Surgery [facility]   10/04/2017 03:00 PM Pete Lesches, LD, RD Dietitian [facility]     Signatures   Electronically signed by : KAIDEN Sandoval; Aug 31 2017 11:26AM EST                       (Author)    Electronically signed by :  GONZALO Jones ; Sep 11 2017 11:16AM EST

## 2018-01-10 NOTE — RESULT NOTES
Dear Joann Hussein,   Your test results have returned and are listed below:      Discussion/Summary  Your results have some minor abnormalities, but nothing that is concerning  Your next office appointment is scheduled for August 31, 2017 at 10:00am       If you have any questions, please don't hesitate to call the office  Sincerely,      Signatures   Electronically signed by : GUNNAR Campbell RD; Aug  9 2017  9:17AM EST                       (Author)    Electronically signed by :  GONZALO Montero ; Aug 16 2017  3:53PM EST

## 2018-01-11 NOTE — PROGRESS NOTES
Active Problems    1  Atrial fibrillation (427 31) (I48 91)   2  Benign essential hypertension (401 1) (I10)   3  Bladder infection, acute (595 0) (N30 00)   4  History of underactive thyroid (V12 29) (Z86 39)   5  Hypothyroid (244 9) (E03 9)   6  Irregular heart beat (427 9) (I49 9)   7  Morbid obesity (278 01) (E66 01)   8  Postgastrectomy malabsorption (579 3) (K91 2,Z90 3)   9  Status post laparoscopic sleeve gastrectomy (V45 86) (Z98 84)    Current Meds   1  Calcium Citrate Chewy Bite CHEW;   Therapy: (Recorded:31Aug2017) to Recorded   2  Coreg 25 MG Oral Tablet (Carvedilol); Therapy: (Recorded:31Aug2017) to Recorded   3  Daily Multiple Vitamins TABS; Therapy: (Recorded:02Jun2017) to Recorded   4  Eliquis 5 MG Oral Tablet; Therapy: (Recorded:31Aug2017) to Recorded   5  Levothyroxine Sodium 200 MCG Oral Tablet; Therapy: (Recorded:31Aug2017) to Recorded   6  Levothyroxine Sodium 50 MCG Oral Tablet; TAKE 1 TABLET DAILY; Therapy: (Recorded:31Aug2017) to Recorded   7  Macrobid CAPS (Nitrofurantoin Monohyd Macro); Therapy: (Recorded:31Aug2017) to Recorded   8  Omeprazole 20 MG Oral Capsule Delayed Release; TAKE 1 CAPSULE DAILY EVERY   MORNING BEFORE BREAKFAST; Therapy: 03Aug2017 to ([facility])  Requested for: 08Aug2017; Last   Rx:03Aug2017 Ordered   9  Probiotic Oral Capsule; Therapy: (Recorded:02May2017) to Recorded   10  TraMADol HCl - 50 MG Oral Tablet; Therapy: (Recorded:02May2017) to Recorded   11  Vitamin D3 2000 UNIT Oral Capsule; Therapy: (Recorded:02May2017) to Recorded    Allergies    1  No Known Drug Allergies    Discussion/Summary    Patient attended 5 week post op class   Reviewed diet progression, vitamin and mineral recommendations, 30/60 rules, volume of foods, protein supplements, hydration needs, antacid guidelines, recommendation to f/u with pcp concerning med adjustments, exercise guidelines, hair shedding, contraception guidelines, constipation prevention and treatment, alcohol avoidance and recommendations of when to call the office  Patient was also weighed and filled out form for program   Time was left for discussion and to answer questions        Signatures   Electronically signed by : KAIDEN Billy; Oct  4 2017  3:39PM EST                       (Author)

## 2018-01-11 NOTE — PROGRESS NOTES
Discussion/Summary  Discussion Summary:   Assess: Pt here for pre-op nutrition follow-up  Met with pt in FRANSISCO Mejia's office  4 5# wt loss from previous visit  Net 21 4# total wt loss  11 7# wt loss needed yet prior to surgery  Pt has been working on lesson plans 1-6 in bariatric manual and reading bariatric manual in preparation for surgery  Pt will begin exercising for 30-60 minutes 5 days per week  Pt will begin taking a daily multivitamin and 200 IU of vitamin D3  Pt will begin food journaling daily  Pt will begin drinking 64+ ounces of water or other calorie-free, caffeine-free beverages per day, eating three meals per day, and following the 30/60 minute rule to separate foods and fluids  Diagnose: Overweight/Obesity related to positive energy balance as evidenced by BMI>30, pts self-reported activity level and caloric intake  (Improving)  Intervene: Reviewed Pre-Surgery Goals and Lesson Plans in Bariatric Manuals  Pt will continue to work on these  Discussed two-week liver shrinking diet, post-operative vitamins, importance of structured exercise, and importance of post-op PPI, which pt will discuss with surgeon  Monitor/Evaluate: Will monitor pts progress, weight, food journals, and available bloodwork at next follow-up  Pt has contact information for future questions  Active Problems    1  Atrial fibrillation (427 31) (I48 91)   2  Benign essential hypertension (401 1) (I10)   3  History of underactive thyroid (V12 29) (Z86 39)   4  Irregular heart beat (427 9) (I49 9)   5  Morbid obesity (278 01) (E66 01)    Past Medical History    1  History of renal calculi (V13 01) (O24 630)    Surgical History    1  History of Cholecystectomy    Family History  Mother    1  Family history of    2  Family history of diabetes mellitus (V18 0) (Z83 3)   3  Family history of hypertension (V17 49) (Z82 49)  Father    4   Family history of     Social History    · Never a smoker   · No alcohol use   · No illicit drug use    Current Meds   1  Coreg 25 MG Oral Tablet; Therapy: (Recorded:02May2017) to Recorded   2  Daily Multiple Vitamins TABS; Therapy: (Recorded:02Jun2017) to Recorded   3  Eliquis 5 MG Oral Tablet; Therapy: (Recorded:02May2017) to Recorded   4  Levothyroxine Sodium 200 MCG Oral Tablet; Therapy: (Recorded:02May2017) to Recorded   5  Lisinopril 10 MG Oral Tablet; Therapy: (Recorded:02May2017) to Recorded   6  Probiotic Oral Capsule; Therapy: (Recorded:02May2017) to Recorded   7  TraMADol HCl - 50 MG Oral Tablet; Therapy: (Recorded:02May2017) to Recorded   8  Vitamin D3 2000 UNIT Oral Capsule; Therapy: (Recorded:02May2017) to Recorded    Allergies    1  No Known Drug Allergies    Vitals  Signs   Recorded: 44XUT9986 01:04PM   Weight: 309 lb 8 oz  BMI Calculated: 56 24  BSA Calculated: 2 31    Future Appointments    Date/Time Provider Specialty Site   08/03/2017 02:45 GONZALO Park  [facility]   08/31/2017 10:00 AM Debria Sandifer, M D  General Surgery [facility]     Signatures   Electronically signed by : GUNNAR Parekh RD; Jul 6 2017  1:05PM EST                       (Author)    Electronically signed by :  Gerhardt Ely, M D ; Jul 11 2017  9:16AM EST

## 2018-01-12 VITALS
BODY MASS INDEX: 53.92 KG/M2 | WEIGHT: 293 LBS | RESPIRATION RATE: 15 BRPM | HEIGHT: 62 IN | TEMPERATURE: 98.7 F | HEART RATE: 94 BPM | SYSTOLIC BLOOD PRESSURE: 100 MMHG | DIASTOLIC BLOOD PRESSURE: 60 MMHG

## 2018-01-12 VITALS
HEART RATE: 80 BPM | WEIGHT: 269.5 LBS | SYSTOLIC BLOOD PRESSURE: 122 MMHG | HEIGHT: 62 IN | BODY MASS INDEX: 49.59 KG/M2 | DIASTOLIC BLOOD PRESSURE: 66 MMHG | TEMPERATURE: 98 F | RESPIRATION RATE: 18 BRPM

## 2018-01-12 VITALS — WEIGHT: 293 LBS | BODY MASS INDEX: 53.92 KG/M2 | HEIGHT: 62 IN

## 2018-01-12 NOTE — MISCELLANEOUS
Message  8/25/2017 @ 1140 - post op follow up phone call completed  Pt is sipping liquids, using IS as instructed, reinforced importance of using IS to help prevent pneumonia  Ambulating about home without difficulty  Minimal pain, not using Percocet  Reaffirmed examples of liquid diet over the next week  Pt stated understanding about discharge instructions and medication adjustments  [facility] [id number] [facility]  Follow up appt with surgeon scheduled for next week  Instructed to call with any additional questions or concerns  Active Problems    1  Atrial fibrillation (427 31) (I48 91)   2  Benign essential hypertension (401 1) (I10)   3  History of underactive thyroid (V12 29) (Z86 39)   4  Irregular heart beat (427 9) (I49 9)   5  Morbid obesity (278 01) (E66 01)    Current Meds   1  Coreg 25 MG Oral Tablet (Carvedilol); Therapy: (Recorded:02May2017) to Recorded   2  Daily Multiple Vitamins TABS; Therapy: (Recorded:02Jun2017) to Recorded   3  Eliquis 5 MG Oral Tablet; Therapy: (Recorded:02May2017) to Recorded   4  Levothyroxine Sodium 200 MCG Oral Tablet; Therapy: (Recorded:02May2017) to Recorded   5  Lisinopril 10 MG Oral Tablet; Therapy: (Recorded:02May2017) to Recorded   6  Omeprazole 20 MG Oral Capsule Delayed Release; TAKE 1 CAPSULE DAILY EVERY   MORNING BEFORE BREAKFAST; Therapy: 03Aug2017 to (Georges Crabtree)  Requested for: 08Aug2017; Last   Rx:03Aug2017 Ordered   7  Oxycodone-Acetaminophen 5-325 MG Oral Tablet (Percocet); TAKE 1 TABLET EVERY 4   TO 6 HOURS AS NEEDED FOR PAIN;   Therapy: 01Aug2017 to (Evaluate:[id number]); Last Rx:01Aug2017 Ordered   8  Probiotic Oral Capsule; Therapy: (Recorded:02May2017) to Recorded   9  TraMADol HCl - 50 MG Oral Tablet; Therapy: (Recorded:02May2017) to Recorded   10  Vitamin D3 2000 UNIT Oral Capsule; Therapy: (Recorded:02May2017) to Recorded    Allergies    1   No Known Drug Allergies    Signatures   Electronically signed by : Fior Wheeler, ; Aug 25 2017 11:46AM EST                       (Author)

## 2018-01-12 NOTE — MISCELLANEOUS
Message  I had a talk with the patient regrading her Ct scan findings and decided to proceed with a sleeve instead of a bypass just in case patient ends up needing an ERCP      Signatures   Electronically signed by :  GONZALO Ann ; Douglas 15 2017 10:46AM EST                       (Author)

## 2018-01-12 NOTE — RESULT NOTES
Message  patient called for CT results and follow-up plans  I called the number back but got voicemail  I left message advising her that I could advise her on results and Dr Kristyn Justice will be reviewing CT this week and she will be advised of follow-up  CR    patient called me back  Advised her report does indicate gallstones  Advised her that DR Darius cope will review the CT and our staff will get back to her with plans for follow-up  CR1        1 Amended By: Elisabeth Duff; Jun 14 2017 2:29 PM EST    Signatures   Electronically signed by : Mat Carreno, [facility]; Jun 14 2017  2:29PM EST                       (Author)

## 2018-01-12 NOTE — PROGRESS NOTES
Chief Complaint  Chief Complaint Free Text Note Form: Patient states her surgery date is  with team meeting on 8/3  She is not doing any scheduled activities and states her Sciatica has been causing a lot of pain  She will try to start exercising more regularly  Patient states she is doing some food journaling but is not doing any behavioral health journaling  Patient had numerous questions regarding vitamins, protein shakes and the team meeting  LS, her dietitian was asked to join the meeting  Active Problems    1  Atrial fibrillation (427 31) (I48 91)   2  Benign essential hypertension (401 1) (I10)   3  History of underactive thyroid (V12 29) (Z86 39)   4  Irregular heart beat (427 9) (I49 9)   5  Morbid obesity (278 01) (E66 01)    Past Medical History    1  History of renal calculi (V13 01) (F64 764)    Surgical History    1  History of Cholecystectomy    Family History  Mother    1  Family history of    2  Family history of diabetes mellitus (V18 0) (Z83 3)   3  Family history of hypertension (V17 49) (Z82 49)  Father    4  Family history of     Social History    · Never a smoker   · No alcohol use   · No illicit drug use    Current Meds   1  Coreg 25 MG Oral Tablet (Carvedilol); Therapy: (Recorded:2017) to Recorded   2  Daily Multiple Vitamins TABS; Therapy: (Recorded:2017) to Recorded   3  Eliquis 5 MG Oral Tablet; Therapy: (Recorded:2017) to Recorded   4  Levothyroxine Sodium 200 MCG Oral Tablet; Therapy: (Recorded:2017) to Recorded   5  Lisinopril 10 MG Oral Tablet; Therapy: (Recorded:2017) to Recorded   6  Probiotic Oral Capsule; Therapy: (Recorded:2017) to Recorded   7  TraMADol HCl - 50 MG Oral Tablet; Therapy: (Recorded:2017) to Recorded   8  Vitamin D3 2000 UNIT Oral Capsule; Therapy: (Recorded:2017) to Recorded    Allergies    1   No Known Drug Allergies    Future Appointments    Date/Time Provider Specialty Site 08/03/2017 02:45 PM GONZALO Huizar  General Surgery [facility]   08/31/2017 10:00 AM GONZALO Huizar  General Surgery [facility]     Signatures   Electronically signed by : Silvana Newman LCSWMSWMSFRANSISCO DALAL; Jul 6 2017 12:28PM EST                       (Author)    Electronically signed by :  GONZALO Yu ; Jul 11 2017  9:16AM EST

## 2018-01-13 VITALS
WEIGHT: 288 LBS | HEART RATE: 85 BPM | SYSTOLIC BLOOD PRESSURE: 128 MMHG | BODY MASS INDEX: 53 KG/M2 | HEIGHT: 62 IN | DIASTOLIC BLOOD PRESSURE: 72 MMHG | TEMPERATURE: 97.5 F

## 2018-01-13 VITALS — WEIGHT: 293 LBS | BODY MASS INDEX: 57.06 KG/M2

## 2018-01-13 VITALS
DIASTOLIC BLOOD PRESSURE: 90 MMHG | SYSTOLIC BLOOD PRESSURE: 124 MMHG | BODY MASS INDEX: 53.92 KG/M2 | RESPIRATION RATE: 16 BRPM | WEIGHT: 293 LBS | HEART RATE: 72 BPM | HEIGHT: 62 IN | TEMPERATURE: 97.2 F

## 2018-01-13 VITALS
HEART RATE: 80 BPM | BODY MASS INDEX: 53.92 KG/M2 | SYSTOLIC BLOOD PRESSURE: 110 MMHG | WEIGHT: 293 LBS | TEMPERATURE: 97.5 F | RESPIRATION RATE: 18 BRPM | DIASTOLIC BLOOD PRESSURE: 72 MMHG | HEIGHT: 62 IN

## 2018-01-13 NOTE — PROGRESS NOTES
History of Present Illness  Bariatric Behavioral Health Evaluation [facility]:   She is here today because: Increase health, increase mobility, reduce chronic pain  She is seeking a Bariatric surgery evaluation  She researched this option for: 2 years  She realizes the post-op requirements was previously at [facility] only to discover a week before her surgery that they are not covered in her insurance plan   Her Psychiatric/Psychological diagnosis: She does not have an outpatient counselor  She does not have a Psychiatrist    She has not had Inpatient Treatment  Family Constellation: Family Constellation: Mother: obesity,  Father: obesity, some hoarding,  Siblings: obesity, tobacco use,  Spouse: n/a  Children: n/a  Other: ETOH  She lives alone  Domestic Violence: has not happened  Abuse History: (patient denies childhood trauma)   Additional Comments: health, weight chronic pain, family relations  Physical/psychological assessment Appearance: appropriate   Sociability: average  Affect: appropriate  Mood: calm  Thought Process: coherent  Speech: normal  Content: no impairment  The patient was oriented to person, oriented to place, oriented to time and normal memory   normal attention span  no decreased concentration ability  normal judgment  Her emotional insight was: fair  Her intellectual insight was: fair  Risk assessment: The patient is currently asymptomatic  No associated symptoms are reported  Sexual history: She is not pregnant  She does not have a history of STD's  Recommendations: She is recommended for surgery  The Following Ratings are based on my: Obsevation of this individual over the last  Risk of Harm to Self or Others: The following are demographic risk factors associated with harm to self: , [place], or   (n/a)  Recent Specific Risk Factors: The patient is currently asymptomatic  No associated symptoms are reported     Access to Weapons:   She has access to the following weapons: denies access to weapons   Summary and Recommendations:   Low: No thoughts or occasional thoughts of suicide, but no intent or actions  Self inflicted scratches, abrasions, or other self- injurious of behavior where medical attention is typically not warranted  No elements of homicidality or occasional thoughts but no plan, intent, or actions   Note   Note:   Patient denies Axis I diagnosis or treatment  Patient educated regarding the impact of nicotine and alcohol on the post bariatric surgery patient  Patient meets criteria to have surgery at this program and is referred to physician  Signatures   Electronically signed by :  MAYTE Maria LCSW; May  2 2017  2:08PM EST                       (Author)    Electronically signed by : GONZALO Benjamin ; May  4 2017  9:07AM EST                       (Validation)

## 2018-01-14 VITALS — BODY MASS INDEX: 51.69 KG/M2 | WEIGHT: 280.9 LBS | HEIGHT: 62 IN

## 2018-01-14 VITALS — BODY MASS INDEX: 56.24 KG/M2 | WEIGHT: 293 LBS

## 2018-01-14 NOTE — PROGRESS NOTES
Message  Reviewed post-operative pureed and soft foods diet with pt  Discussed gradual diet advancement and portion size progression  Discussed adequate hydration, signs and symptoms of dehydration  Discussed recommended post-operative vitamin supplementation and protein supplements  Discussed importance of regular physical activity  Provided pt with contact information for future questions/concerns  Active Problems    1  Atrial fibrillation (427 31) (I48 91)   2  Benign essential hypertension (401 1) (I10)   3  Bladder infection, acute (595 0) (N30 00)   4  History of underactive thyroid (V12 29) (Z86 39)   5  Hypothyroid (244 9) (E03 9)   6  Irregular heart beat (427 9) (I49 9)   7  Morbid obesity (278 01) (E66 01)   8  Postgastrectomy malabsorption (579 3) (K91 2,Z90 3)   9  Status post laparoscopic sleeve gastrectomy (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 86) (Z98 84)    Current Meds   1  Calcium Citrate Chewy Bite CHEW;   Therapy: (Recorded:31Aug2017) to Recorded   2  Coreg 25 MG Oral Tablet (Carvedilol); Therapy: (Recorded:31Aug2017) to Recorded   3  Daily Multiple Vitamins TABS; Therapy: (Recorded:02Jun2017) to Recorded   4  Eliquis 5 MG Oral Tablet; Therapy: (Recorded:31Aug2017) to Recorded   5  Levothyroxine Sodium 200 MCG Oral Tablet; Therapy: (Recorded:31Aug2017) to Recorded   6  Levothyroxine Sodium 50 MCG Oral Tablet; TAKE 1 TABLET DAILY; Therapy: (Recorded:31Aug2017) to Recorded   7  Macrobid CAPS (Nitrofurantoin Monohyd Macro); Therapy: (Recorded:31Aug2017) to Recorded   8  Omeprazole 20 MG Oral Capsule Delayed Release; TAKE 1 CAPSULE DAILY EVERY   MORNING BEFORE BREAKFAST; Therapy: 03Aug2017 to (Ida )  Requested for: 08Aug2017; Last   Rx:03Aug2017 Ordered   9  Probiotic Oral Capsule; Therapy: (Recorded:02May2017) to Recorded   10  TraMADol HCl - 50 MG Oral Tablet; Therapy: (Recorded:02May2017) to Recorded   11  Vitamin D3 2000 UNIT Oral Capsule;     Therapy: (Recorded:02May2017) to Recorded    Allergies    1   No Known Drug Allergies    Signatures   Electronically signed by : KAIDEN Velazco; Aug 31 2017 11:26AM EST                       (Author)

## 2018-01-14 NOTE — PROGRESS NOTES
Chief Complaint  Chief Complaint Free Text Note Form: patient will increase vegetables and fruits  she will increase exercise  patient was instructed by dietitian to maintain a 3308-3737 calorie diet daily  patient will meet with OR today also to schedule surgery and team meeting appointment  next sw and dietary appointment scheduled for 2017  Active Problems    1  Atrial fibrillation (427 31) (I48 91)   2  Benign essential hypertension (401 1) (I10)   3  History of underactive thyroid (V12 29) (Z86 39)   4  Irregular heart beat (427 9) (I49 9)   5  Morbid obesity (278 01) (E66 01)    Past Medical History    1  History of renal calculi (V13 01) (D73 469)    Surgical History    1  History of Cholecystectomy    Family History  Mother    1  Family history of    2  Family history of diabetes mellitus (V18 0) (Z83 3)   3  Family history of hypertension (V17 49) (Z82 49)  Father    4  Family history of     Social History    · Never a smoker   · No alcohol use   · No illicit drug use    Current Meds   1  Coreg 25 MG Oral Tablet (Carvedilol); Therapy: (Recorded:2017) to Recorded   2  Daily Multiple Vitamins TABS; Therapy: (Recorded:2017) to Recorded   3  Eliquis 5 MG Oral Tablet; Therapy: (Recorded:2017) to Recorded   4  Levothyroxine Sodium 200 MCG Oral Tablet; Therapy: (Recorded:2017) to Recorded   5  Lisinopril 10 MG Oral Tablet; Therapy: (Recorded:2017) to Recorded   6  Probiotic Oral Capsule; Therapy: (Recorded:2017) to Recorded   7  TraMADol HCl - 50 MG Oral Tablet; Therapy: (Recorded:2017) to Recorded   8  Vitamin D3 2000 UNIT Oral Capsule; Therapy: (Recorded:2017) to Recorded    Allergies    1  No Known Drug Allergies    Signatures   Electronically signed by : Miah Simeon LCSWMSWFRANSISCO ALFARO; 2017 10:57AM EST                       (Author)    Electronically signed by :  GONZALO Rockwell Si ; 2017  1:04PM EST

## 2018-01-15 NOTE — PROGRESS NOTES
Discussion/Summary  Discussion Summary:   Assess: Pt here for nutrition follow-up  Met with pt with FRANSISCO Altamirano also present  0 5# wt loss from previous visit  Total 16 8# wt loss  Pt needs additional 15# wt loss prior to surgery  Pt is keeping spark people food journal debora  Pt states she has been keeping her calories to around 1200 and her carbs to 40-60 grams per day, and more than 90 gram protein per day  Pt has been working on lesson plans 1-6 in bariatric manual and reading bariatric manual in preparation for surgery  Pt will begin exercising for 30-60 minutes 5 days per week  Pt will begin taking a daily multivitamin and 200 IU of vitamin D3  Pt will begin food journaling daily  Pt will begin drinking 64+ ounces of water or other calorie-free, caffeine-free beverages per day, eating three meals per day, and following the 30/60 minute rule to separate foods and fluids  Diagnose: Overweight/Obesity related to positive energy balance as evidenced by BMI>30, pts self-reported activity level and caloric intake  Intervene: Reviewed Pre-Surgery Goals and Lesson Plans in Bariatric Manuals  Pt will continue to work on these  Discussed balanced diet macronutrient goals  Pt will continue to food journal   Monitor/Evaluate: Will monitor pts progress, weight, food journals, and available bloodwork at next follow-up  Pt referred to  for surgery scheduling  Active Problems    1  Atrial fibrillation (427 31) (I48 91)   2  Benign essential hypertension (401 1) (I10)   3  History of underactive thyroid (V12 29) (Z86 39)   4  Irregular heart beat (427 9) (I49 9)   5  Morbid obesity (278 01) (E66 01)    Past Medical History    1  History of renal calculi (V13 01) (L81 125)    Surgical History    1  History of Cholecystectomy    Family History  Mother    1  Family history of    2  Family history of diabetes mellitus (V18 0) (Z83 3)   3   Family history of hypertension (V17 49) (Z82 49)  Father    4  Family history of     Social History    · Never a smoker   · No alcohol use   · No illicit drug use    Current Meds   1  Coreg 25 MG Oral Tablet; Therapy: (Recorded:2017) to Recorded   2  Daily Multiple Vitamins TABS; Therapy: (Recorded:2017) to Recorded   3  Eliquis 5 MG Oral Tablet; Therapy: (Recorded:2017) to Recorded   4  Levothyroxine Sodium 200 MCG Oral Tablet; Therapy: (Recorded:2017) to Recorded   5  Lisinopril 10 MG Oral Tablet; Therapy: (Recorded:2017) to Recorded   6  Probiotic Oral Capsule; Therapy: (Recorded:2017) to Recorded   7  TraMADol HCl - 50 MG Oral Tablet; Therapy: (Recorded:2017) to Recorded   8  Vitamin D3 2000 UNIT Oral Capsule; Therapy: (Recorded:2017) to Recorded    Allergies    1  No Known Drug Allergies    Vitals  Signs   Recorded: [id number] 01:12PM   Weight: 314 lb   BMI Calculated: 57 06  BSA Calculated: 2 32    Future Appointments    Date/Time Provider Specialty Site   2017 02:45 GONZALO Buitrago  General Surgery [facility]   2017 10:00 AM GONZALO Watson  General Surgery [place] [address] [facility] CENTER     Signatures   Electronically signed by : GUNNAR Anthony RD; 2017  1:13PM EST                       (Author)    Electronically signed by :  GONZALO Wise ; 2017  1:04PM EST

## 2018-01-15 NOTE — PROGRESS NOTES
History of Present Illness  Bariatric MNT [facility] Surgery Screening Preop [facility]:     She was on time  The patient was present at the session  The diagnosis/reason for the appointment is: She has a BMI of 60 1 and will need to lose 10% of her ABW  She has the following comorbidities: hypertension  Exercise Frequency:  She does not exercise  Relationship to food: She is an emotional eater, eats when she is bored and eats large portions  She felt/thought they felt her best at 170lbs pounds She completed a food journal She drinks 64oz daily cups of water daily She drinks none caffienated beverages daily Her motivators are:improve health, decrease knee and back pain  Her obesity/being overweight is related to excessive energy intake, sedentary lifestyle and is evidenced by: BMI 60 1  Knowledge Deficit Prior to Education  She previously completed 6 month pre-op program at another [facility]  Barriers to education:  She has no barriers to education  Medical Nutrition Therapy Intervention: Individualized Meal Plan, Daily Food /Exercise Diary, Lifestyle /Behavior Modification Techniques, Basic Pathophysiology of Obesity, Label Reading, Checklist of the Overview of Lesson Plans, Surgical changes to Stomach/GI and Food/Medication Interactions  Area's Reviewed: Post - surgery goal weight, Web forum, Lifestyle [place] Modification Techniques, [employer] in [place] [employer] ( hand out for [employer]), Pre- surgery goals /rules and Appetite Awareness  Brief Review of Vitamins, diet progression for post-op and Pathophysiology of Obesity  Her comprehension of the presented material was very good  Her receptivity to the presented material was very good  Her motivation was very good  Provided: Nutrition Guidelines for Gastric Surgery, Food Journaling Application handout, Bariatric Program Pre- Surgery Nutrition and Goals   Patient is a 49year old who is here for nutritional evaluation for weight loss surgery  I reviewed comorbidities and medications prior to session  She first recalls having problems with weight gain at the age of 10  She has dieted in the past with variable success but would regain the weight back  (refer to diet history for details)  For her personal pre-operative goals she will: 1  food journal daily 2  practice 30/60 rule 3  measure portions She will complete all 6 lesson plans in her bariatric booklet  She was instructed on the importance of consistent vitamin and mineral intake after her surgery to prevent deficiencies  She currently takes a vitamin D   Recommended that she take an adult multivitamin/mineral plus 2000 IU of vitamin D3  Reviewed importance of support after surgery and discussed the web forum, kindra, pep rally and support group  Patient states adequate knowledge of nutrition, exercise and behavior modification required for long term success  Pt  is recommended for surgery and is aware that she will be required to follow the 2 week pre operative liver shrinking diet prior to surgery  Pt also understands that she needs to implement lifestyle changes in preparation for surgery  Patient is aware that she has 6 months of weigh ins required by her insurance, however patient reports having previously completed a 6 month pre-op program elsewhere and has requested her records be forwarded to our office  Discussed with patient that 10% weight loss is recommended due to her current BMI of 60 1 (10%=33lbs, goal weight 297 8 lbs)however per patient she reports having lost weight during pre-op program already completed  Will discuss with surgeon and monitor for appropriate weight loss prior to surgery as determined by surgeon   Active Problems    1  Atrial fibrillation (427 31) (I48 91)   2  Benign essential hypertension (401 1) (I10)   3  History of underactive thyroid (V12 29) (Z86 39)   4  Irregular heart beat (427 9) (I49 9)   5   Morbid obesity (278 01) (E66 01)    Past Medical History    1  History of renal calculi (V13 01) (O84 723)    Surgical History    1  History of Cholecystectomy    Family History  Mother    1  Family history of    2  Family history of diabetes mellitus (V18 0) (Z83 3)   3  Family history of hypertension (V17 49) (Z82 49)  Father    4  Family history of     Social History    · Never a smoker   · No alcohol use   · No illicit drug use    Current Meds   1  Coreg 25 MG Oral Tablet; Therapy: (Recorded:2017) to Recorded   2  Eliquis 5 MG Oral Tablet; Therapy: (Recorded:2017) to Recorded   3  Levothyroxine Sodium 200 MCG Oral Tablet; Therapy: (Recorded:2017) to Recorded   4  Lisinopril 10 MG Oral Tablet; Therapy: (Recorded:2017) to Recorded   5  Probiotic Oral Capsule; Therapy: (Recorded:2017) to Recorded   6  TraMADol HCl - 50 MG Oral Tablet; Therapy: (Recorded:2017) to Recorded   7  Vitamin D3 2000 UNIT Oral Capsule; Therapy: (Recorded:2017) to Recorded    Allergies    1  No Known Drug Allergies    Vitals  Signs   Recorded: 82TNO2601 03:32PM   Height: 5 ft 2 25 in  Weight: 330 lb 12 8 oz  BMI Calculated: 60 02  BSA Calculated: 2 37    Signatures   Electronically signed by : KAIDEN Rodriguez; May  2 2017  3:35PM EST                       (Author)    Electronically signed by :  GONZALO Mesa ; May  4 2017 12:09PM EST

## 2018-01-16 NOTE — RESULT NOTES
Dear Joann Hussein,   Your test results have returned and are listed below:      Plan  Atrial fibrillation, Benign essential hypertension, Health Maintenance    · (1) COMPREHENSIVE METABOLIC PANEL; Status:Active -  Retrospective By Protocol Authorization; Requested for:[id number];   Atrial fibrillation, Benign essential hypertension, Health Maintenance,  History of underactive thyroid, Irregular heart beat, Morbid  obesity    · (1) LIPID PANEL, FASTING; Status:Active - Retrospective By  Protocol Authorization; Requested for:[id number];   Atrial fibrillation, Benign essential hypertension, History of underactive  thyroid, Irregular heart beat    · (1) TSH WITH FT4 REFLEX; Status:Active - Retrospective By  Protocol Authorization; Requested for:[id number];   Atrial fibrillation, Benign essential hypertension, History of underactive  thyroid, Irregular heart beat, Morbid obesity    · (1) CBC/ PLT (NO DIFF); Status:Active - Retrospective By Protocol  Authorization; Requested for:[id number];     Discussion/Summary  The most recent pre-operative bloodwork that we have for you is from November 2016  Enclosed is a lab slip to have more recent bloodwork done prior to your surgery  If you have already had these labs drawn in the past 2-3 months please forward the results to our office and disregard this lab slip  Your next office appointment is scheduled for August 3, 2017 at 2:45pm       If you have any questions, please don't hesitate to call the office  Sincerely,      Signatures   Electronically signed by : GUNNAR Campbell RD; Jul 31 2017 12:31PM EST                       (Author)    Electronically signed by :  GONZALO Montero ; Aug  2 2017  1:52PM EST

## 2018-01-16 NOTE — MISCELLANEOUS
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 [employer]  No exercise (patient unsure if she was cleared for exercise by surgeon)  RD encouraged patient to continue journaling daily and to keep total volume at meals to 1/4 cup and to consume 3 meals with 1 snack daily  RD also encouraged patient to follow-up on if she is cleared to exercise and to begin incorporating exercise into her daily routines as allowed  Recommend calories to be 600 or below until patient is incorporating exercise routinely  Instructed patient to call RD back in 1 week if patient is not seeing a change in her weight to schedule an appointment in the office  Patient verbalized understanding and agreement with RD      Signatures   Electronically signed by : KAIDEN Bronson; Sep 14 2017 11:40AM EST                       (Author)    Electronically signed by :  GONZALO Jones ; Oct  4 2017  8:36AM EST

## 2018-01-23 VITALS — WEIGHT: 262 LBS | BODY MASS INDEX: 48.21 KG/M2 | HEIGHT: 62 IN

## 2018-01-23 VITALS
RESPIRATION RATE: 18 BRPM | HEIGHT: 62 IN | WEIGHT: 265 LBS | HEART RATE: 78 BPM | TEMPERATURE: 98 F | DIASTOLIC BLOOD PRESSURE: 66 MMHG | BODY MASS INDEX: 48.76 KG/M2 | SYSTOLIC BLOOD PRESSURE: 102 MMHG

## 2018-01-23 NOTE — PROGRESS NOTES
Message  Received referral from Dr Kashif Howard to follow up with pt  Called pt, left voicemail message with contact # and office hours requesting return call  Active Problems    1  Atrial fibrillation (427 31) (I48 91)   2  Benign essential hypertension (401 1) (I10)   3  Bladder infection, acute (595 0) (N30 00)   4  Cholelithiasis (574 20) (K80 20)   5  History of underactive thyroid (V12 29) (Z86 39)   6  Hypothyroid (244 9) (E03 9)   7  Irregular heart beat (427 9) (I49 9)   8  Morbid obesity (278 01) (E66 01)   9  Postgastrectomy malabsorption (579 3) (K91 2,Z90 3)   10  Right upper quadrant abdominal pain (789 01) (R10 11)   11  Status post laparoscopic sleeve gastrectomy (V45 86) (Z98 84)    Current Meds   1  Calcium Citrate Chewy Bite CHEW;   Therapy: (Recorded:31Aug2017) to Recorded   2  Coreg 25 MG Oral Tablet (Carvedilol); Therapy: (Recorded:31Aug2017) to Recorded   3  Daily Multiple Vitamins TABS; Therapy: (Recorded:02Jun2017) to Recorded   4  Eliquis 5 MG Oral Tablet; Therapy: (Recorded:31Aug2017) to Recorded   5  Levothyroxine Sodium 200 MCG Oral Tablet; Therapy: (Recorded:31Aug2017) to Recorded   6  Levothyroxine Sodium 50 MCG Oral Tablet; TAKE 1 TABLET DAILY; Therapy: (Recorded:31Aug2017) to Recorded   7  Omeprazole 20 MG Oral Capsule Delayed Release; TAKE 1 CAPSULE DAILY EVERY   MORNING BEFORE BREAKFAST; Therapy: 03Aug2017 to (Dena Saez)  Requested for: [id number]; Last   Rx:[id number]; Status: ACTIVE - Retrospective By Protocol Authorization Ordered   8  Probiotic Oral Capsule; Therapy: (Recorded:02May2017) to Recorded   9  TraMADol HCl - 50 MG Oral Tablet; Therapy: (Recorded:02May2017) to Recorded    Allergies    1  No Known Drug Allergies    Signatures   Electronically signed by : GUNNAR Antunez RD;  Dec 26 2017  3:08PM EST                       (Author)

## 2018-01-23 NOTE — RESULT NOTES
Verified Results  Serenity MATTHEWS  23  [id number] 11:21AM Damon Cyr Order Number: [id number]    - Patient Instructions: To schedule this appointment, please contact Central Scheduling at [phone]  Test Name Result Flag Reference   US GALLBLADDER (Report)     RIGHT UPPER QUADRANT ULTRASOUND     INDICATION: Right upper quadrant pain after eating     COMPARISON: CT from 6/8/2017     TECHNIQUE:  Real-time ultrasound of the right upper quadrant was performed with a curvilinear transducer with both volumetric sweeps and still imaging techniques  FINDINGS:     PANCREAS: Portions of the pancreas are obscured by bowel gas  Visualized portions of the pancreas are unremarkable  AORTA AND IVC: Visualized portions are normal for patient age  LIVER:   Size: Within normal range  The liver measures 16 4 cm in the midclavicular line  Contour: Surface contour is smooth  Parenchyma: Echogenicity and echotexture are within normal limits  No evidence of suspicious mass  Limited imaging of the main portal vein shows it to be patent and hepatopetal       BILIARY:   The gallbladder is normal in caliber  No wall thickening or pericholecystic fluid  There is cholelithiasis with at least one shadowing gallstone as seen on CT  This measures 1 8 cm  There is increased echogenicity in the gallbladder fundus which may represent focal fundal adenomyomatosis  No sonographic Cheney's sign  No intrahepatic biliary dilatation  CBD measures 7 mm  No choledocholithiasis  KIDNEY:    Right kidney measures 11 4 x 6 3 cm  Within normal limits  ASCITES:  None  IMPRESSION:     Cholelithiasis  Common bile duct is top normal to minimally dilated though no stones are seen         Workstation performed: [id number]     Signed by:   James Mccoy MD   12/9/17

## 2018-01-23 NOTE — PROGRESS NOTES
Discussion/Summary  Discussion Summary:   Assess: Pt here for post-op nutrition follow-up  3# wt loss from previous visit  68 8# total wt loss with program (36% EWL)  Pt expressed concerns of inadequate weight loss  Pt has been food journaling with [employer] smartphone debora  Pt reports she works from home and work is slow right now due to the holidays, which has caused her to return to grazing/picking throughout her day  Pt picks on nut clusters, hard cheeses, crackers, etc  Pt also reports a decrease in her exercise routine  Pt is taking Bariatric Advantage vitamins  Diagnose: Overweight/Obesity related to positive energy balance as evidenced by BMI>30, pts self-reported activity level and caloric intake  Intervene: Reviewed pt's food journals with pt  Discussed pt's boredom grazing and suggestions to prevent this  Pt will begin exercise DVDs when she is bored  Pt will set timers for her meals and to practice the 30/60 rule  Monitor/Evaluate: Will monitor pts progress, weight, food journals, and available bloodwork at next follow-up  Pt has contact info for future questions/concerns  Active Problems    1  Atrial fibrillation (427 31) (I48 91)   2  Benign essential hypertension (401 1) (I10)   3  Bladder infection, acute (595 0) (N30 00)   4  Cholelithiasis (574 20) (K80 20)   5  History of underactive thyroid (V12 29) (Z86 39)   6  Hypothyroid (244 9) (E03 9)   7  Irregular heart beat (427 9) (I49 9)   8  Morbid obesity (278 01) (E66 01)   9  Postgastrectomy malabsorption (579 3) (K91 2,Z90 3)   10  Right upper quadrant abdominal pain (789 01) (R10 11)   11  Status post laparoscopic sleeve gastrectomy (V45 86) (B94 96)    Past Medical History    1  History of renal calculi (V13 01) (C50 975)    Surgical History    1  History of Cholecystectomy   2  History of Gastrectomy Sleeve    Family History  Mother    1  Family history of    2  Family history of diabetes mellitus (V18 0) (Z83 3)   3   Family history of hypertension (V17 49) (Z82 49)  Father    4  Family history of     Social History    · Never a smoker   · No alcohol use   · No illicit drug use    Current Meds   1  Calcium Citrate Chewy Bite CHEW;   Therapy: (Recorded:2017) to Recorded   2  Coreg 25 MG Oral Tablet; Therapy: (Recorded:2017) to Recorded   3  Daily Multiple Vitamins TABS; Therapy: (Recorded:2017) to Recorded   4  Eliquis 5 MG Oral Tablet; Therapy: (Recorded:2017) to Recorded   5  Levothyroxine Sodium 200 MCG Oral Tablet; Therapy: (Recorded:2017) to Recorded   6  Levothyroxine Sodium 50 MCG Oral Tablet; TAKE 1 TABLET DAILY; Therapy: (Recorded:2017) to Recorded   7  Omeprazole 20 MG Oral Capsule Delayed Release; TAKE 1 CAPSULE DAILY EVERY   MORNING BEFORE BREAKFAST; Therapy: 2017 to (Chano Perry)  Requested for: [id number]; Last   Rx:[id number]; Status: ACTIVE - Retrospective By Protocol Authorization Ordered   8  Probiotic Oral Capsule; Therapy: (Recorded:2017) to Recorded   9  TraMADol HCl - 50 MG Oral Tablet; Therapy: (Recorded:2017) to Recorded    Allergies    1   No Known Drug Allergies    Vitals  Signs   Recorded: 2018 02:44PM   Height: 5 ft 2 25 in  Weight: 262 lb   BMI Calculated: 47 53  BSA Calculated: 2 15    Future Appointments    Date/Time Provider Specialty Site   2018 09:30 AM Curtis Keenan, [address]     Signatures   Electronically signed by : GUNNAR Phan RD; 2018  2:44PM EST                       (Author)    Electronically signed by : GONZALO Mcdaniel ; 2018  9:40AM EST                       (Validation)

## 2018-01-30 RX ORDER — OMEPRAZOLE 20 MG/1
1 CAPSULE, DELAYED RELEASE ORAL DAILY
COMMUNITY
Start: 2017-08-03 | End: 2019-01-12 | Stop reason: SDUPTHER

## 2018-02-01 ENCOUNTER — OFFICE VISIT (OUTPATIENT)
Dept: BARIATRICS | Facility: CLINIC | Age: 58
End: 2018-02-01
Payer: COMMERCIAL

## 2018-02-01 VITALS
TEMPERATURE: 98.1 F | DIASTOLIC BLOOD PRESSURE: 80 MMHG | SYSTOLIC BLOOD PRESSURE: 100 MMHG | BODY MASS INDEX: 47.29 KG/M2 | HEART RATE: 91 BPM | WEIGHT: 257 LBS | HEIGHT: 62 IN

## 2018-02-01 DIAGNOSIS — K80.20 CALCULUS OF GALLBLADDER WITHOUT CHOLECYSTITIS WITHOUT OBSTRUCTION: ICD-10-CM

## 2018-02-01 DIAGNOSIS — Z98.84 S/P LAPAROSCOPIC SLEEVE GASTRECTOMY: ICD-10-CM

## 2018-02-01 DIAGNOSIS — Z98.84 BARIATRIC SURGERY STATUS: ICD-10-CM

## 2018-02-01 DIAGNOSIS — E66.01 MORBID OBESITY DUE TO EXCESS CALORIES (HCC): Primary | ICD-10-CM

## 2018-02-01 DIAGNOSIS — K91.86: ICD-10-CM

## 2018-02-01 PROCEDURE — 99212 OFFICE O/P EST SF 10 MIN: CPT | Performed by: SURGERY

## 2018-02-01 RX ORDER — NICOTINE POLACRILEX 2 MG
GUM BUCCAL
COMMUNITY
End: 2018-02-27

## 2018-02-01 RX ORDER — LEVOTHYROXINE SODIUM 0.2 MG/1
200 TABLET ORAL DAILY
Refills: 1 | COMMUNITY
Start: 2017-10-30

## 2018-02-01 NOTE — PROGRESS NOTES
Assessment/Plan:      Diagnoses and all orders for this visit:    Morbid obesity due to excess calories ([address] )    Calculus of gallbladder without cholecystitis without obstruction  -     Ambulatory referral to General Surgery; Future    Bariatric surgery status    S/P laparoscopic sleeve gastrectomy    Retained gallstones following open cholecystectomy    Other orders  -     calcium citrate-vitamin d (CALCIUM CITRATE CHEWY BITE) 500-500 MG-UNIT CHEW chewable tablet; Chew  -     omeprazole (PriLOSEC) 20 mg delayed release capsule; Take 1 capsule by mouth Daily  -     levothyroxine 200 mcg tablet; Take 200 mcg by mouth daily  -     Biotin 1 MG CAPS; Take by mouth             Subjective:     Patient ID: Mckenna Saha is a 56 y o  female  Patient presents to review the results of her MRI  The patient has a history of open subtotal cholecystectomy for cholecystitis with subsequent intermittent right upper quadrant pain  Ultrasound showed cholelithiasis without acute pathology  The MRI confirmed this diagnosis  The patient, in the meantime, reports only mild discomfort that is precipitated only by the ingestion of fatty foods  Otherwise, she is doing well from a bariatric standpoint; she has 35% excess weight loss since her sleeve gastrectomy in August of 2017  Chief Complaint   Patient presents with    Follow-up     Patient is following up from MRI  Townsend Nguyễn      Post-Op Bariatric Surgery Post-Op Lap Sleeve    HPI    Review of Systems      Objective:     Physical Exam        Discussion and Summary:     Weight:   Today's Weight    Weight (last 2 days)     Date/Time   Weight    02/01/18 1044  117 (257)          EWL 35%  Diet and Exercise: Diet Hx Reviewed with Pt and Weight Loss/Gain Discusses   Regular Exercise: yes Type of Exercise walking  Tolerating Foods: all except sausage & butter  Supplements: Multivitamins, Iron, B-12 and Calcium     Patients Capacity to Self Care Patient is able to Self-Care    Assessment Comments  Doing well post op  No major issues  , Increase physical activity as tolerated  and Follow up in three months as scheduled  We will also refer the patient to Dr Marine Awan to establish care in the event that the patient's hepatobiliary disease worsens and she requires a re-exploration for completion cholecystectomy with possible biliary reconstruction

## 2018-02-26 ENCOUNTER — APPOINTMENT (OUTPATIENT)
Dept: LAB | Age: 58
End: 2018-02-26
Payer: COMMERCIAL

## 2018-02-26 ENCOUNTER — TRANSCRIBE ORDERS (OUTPATIENT)
Dept: LAB | Age: 58
End: 2018-02-26

## 2018-02-26 DIAGNOSIS — Z98.84 BARIATRIC SURGERY STATUS: ICD-10-CM

## 2018-02-26 DIAGNOSIS — I10 ESSENTIAL (PRIMARY) HYPERTENSION: ICD-10-CM

## 2018-02-26 DIAGNOSIS — I51.9 MYXEDEMA HEART DISEASE: Primary | ICD-10-CM

## 2018-02-26 DIAGNOSIS — K91.2 POSTSURGICAL MALABSORPTION, NOT ELSEWHERE CLASSIFIED (CODE): ICD-10-CM

## 2018-02-26 DIAGNOSIS — E66.01 MORBID (SEVERE) OBESITY DUE TO EXCESS CALORIES (HCC): ICD-10-CM

## 2018-02-26 DIAGNOSIS — E55.9 AVITAMINOSIS D: ICD-10-CM

## 2018-02-26 DIAGNOSIS — E03.9 MYXEDEMA HEART DISEASE: Primary | ICD-10-CM

## 2018-02-26 PROBLEM — D12.0 BENIGN NEOPLASM OF CECUM: Status: ACTIVE | Noted: 2017-06-29

## 2018-02-26 PROBLEM — N30.00: Status: ACTIVE | Noted: 2017-08-31

## 2018-02-26 PROBLEM — I48.91 ATRIAL FIBRILLATION (HCC): Status: ACTIVE | Noted: 2017-05-02

## 2018-02-26 LAB
25(OH)D3 SERPL-MCNC: 53.6 NG/ML (ref 30–100)
ALBUMIN SERPL BCP-MCNC: 3.8 G/DL (ref 3.5–5)
ALP SERPL-CCNC: 74 U/L (ref 46–116)
ALT SERPL W P-5'-P-CCNC: 21 U/L (ref 12–78)
ANION GAP SERPL CALCULATED.3IONS-SCNC: 7 MMOL/L (ref 4–13)
AST SERPL W P-5'-P-CCNC: 18 U/L (ref 5–45)
BASOPHILS # BLD AUTO: 0.05 THOUSANDS/ΜL (ref 0–0.1)
BASOPHILS NFR BLD AUTO: 1 % (ref 0–1)
BILIRUB SERPL-MCNC: 0.78 MG/DL (ref 0.2–1)
BUN SERPL-MCNC: 18 MG/DL (ref 5–25)
CALCIUM SERPL-MCNC: 9.4 MG/DL (ref 8.3–10.1)
CHLORIDE SERPL-SCNC: 103 MMOL/L (ref 100–108)
CHOLEST SERPL-MCNC: 178 MG/DL (ref 50–200)
CO2 SERPL-SCNC: 28 MMOL/L (ref 21–32)
CREAT SERPL-MCNC: 0.47 MG/DL (ref 0.6–1.3)
EOSINOPHIL # BLD AUTO: 0.2 THOUSAND/ΜL (ref 0–0.61)
EOSINOPHIL NFR BLD AUTO: 4 % (ref 0–6)
ERYTHROCYTE [DISTWIDTH] IN BLOOD BY AUTOMATED COUNT: 14.5 % (ref 11.6–15.1)
EST. AVERAGE GLUCOSE BLD GHB EST-MCNC: 108 MG/DL
FERRITIN SERPL-MCNC: 67 NG/ML (ref 8–388)
FOLATE SERPL-MCNC: >20 NG/ML (ref 3.1–17.5)
GFR SERPL CREATININE-BSD FRML MDRD: 112 ML/MIN/1.73SQ M
GLUCOSE P FAST SERPL-MCNC: 82 MG/DL (ref 65–99)
HBA1C MFR BLD: 5.4 % (ref 4.2–6.3)
HCT VFR BLD AUTO: 42.8 % (ref 34.8–46.1)
HDLC SERPL-MCNC: 53 MG/DL (ref 40–60)
HGB BLD-MCNC: 13.8 G/DL (ref 11.5–15.4)
LDLC SERPL CALC-MCNC: 113 MG/DL (ref 0–100)
LYMPHOCYTES # BLD AUTO: 1.73 THOUSANDS/ΜL (ref 0.6–4.47)
LYMPHOCYTES NFR BLD AUTO: 32 % (ref 14–44)
MCH RBC QN AUTO: 27 PG (ref 26.8–34.3)
MCHC RBC AUTO-ENTMCNC: 32.2 G/DL (ref 31.4–37.4)
MCV RBC AUTO: 84 FL (ref 82–98)
MONOCYTES # BLD AUTO: 0.42 THOUSAND/ΜL (ref 0.17–1.22)
MONOCYTES NFR BLD AUTO: 8 % (ref 4–12)
NEUTROPHILS # BLD AUTO: 3.05 THOUSANDS/ΜL (ref 1.85–7.62)
NEUTS SEG NFR BLD AUTO: 55 % (ref 43–75)
NRBC BLD AUTO-RTO: 0 /100 WBCS
PLATELET # BLD AUTO: 216 THOUSANDS/UL (ref 149–390)
PMV BLD AUTO: 11.6 FL (ref 8.9–12.7)
POTASSIUM SERPL-SCNC: 4.1 MMOL/L (ref 3.5–5.3)
PROT SERPL-MCNC: 8.7 G/DL (ref 6.4–8.2)
PTH-INTACT SERPL-MCNC: 41.3 PG/ML (ref 14–72)
RBC # BLD AUTO: 5.12 MILLION/UL (ref 3.81–5.12)
SODIUM SERPL-SCNC: 138 MMOL/L (ref 136–145)
TRIGL SERPL-MCNC: 60 MG/DL
TSH SERPL DL<=0.05 MIU/L-ACNC: 0.74 UIU/ML (ref 0.36–3.74)
VIT B12 SERPL-MCNC: 1535 PG/ML (ref 100–900)
WBC # BLD AUTO: 5.46 THOUSAND/UL (ref 4.31–10.16)

## 2018-02-26 PROCEDURE — 82607 VITAMIN B-12: CPT

## 2018-02-26 PROCEDURE — 80053 COMPREHEN METABOLIC PANEL: CPT

## 2018-02-26 PROCEDURE — 82746 ASSAY OF FOLIC ACID SERUM: CPT

## 2018-02-26 PROCEDURE — 83970 ASSAY OF PARATHORMONE: CPT

## 2018-02-26 PROCEDURE — 83036 HEMOGLOBIN GLYCOSYLATED A1C: CPT

## 2018-02-26 PROCEDURE — 82306 VITAMIN D 25 HYDROXY: CPT

## 2018-02-26 PROCEDURE — 82728 ASSAY OF FERRITIN: CPT

## 2018-02-26 PROCEDURE — 84425 ASSAY OF VITAMIN B-1: CPT

## 2018-02-26 PROCEDURE — 84443 ASSAY THYROID STIM HORMONE: CPT

## 2018-02-26 PROCEDURE — 85025 COMPLETE CBC W/AUTO DIFF WBC: CPT

## 2018-02-26 PROCEDURE — 84590 ASSAY OF VITAMIN A: CPT

## 2018-02-26 PROCEDURE — 36415 COLL VENOUS BLD VENIPUNCTURE: CPT

## 2018-02-26 PROCEDURE — 80061 LIPID PANEL: CPT

## 2018-02-26 RX ORDER — DOCUSATE SODIUM 100 MG/1
100 CAPSULE, LIQUID FILLED ORAL
COMMUNITY

## 2018-02-27 ENCOUNTER — OFFICE VISIT (OUTPATIENT)
Dept: SURGICAL ONCOLOGY | Facility: CLINIC | Age: 58
End: 2018-02-27
Payer: COMMERCIAL

## 2018-02-27 VITALS
BODY MASS INDEX: 46.56 KG/M2 | SYSTOLIC BLOOD PRESSURE: 128 MMHG | DIASTOLIC BLOOD PRESSURE: 82 MMHG | WEIGHT: 253 LBS | HEIGHT: 62 IN | RESPIRATION RATE: 16 BRPM | TEMPERATURE: 97.7 F

## 2018-02-27 DIAGNOSIS — K80.20 CALCULUS OF GALLBLADDER WITHOUT CHOLECYSTITIS WITHOUT OBSTRUCTION: Primary | ICD-10-CM

## 2018-02-27 PROCEDURE — 99204 OFFICE O/P NEW MOD 45 MIN: CPT | Performed by: SURGERY

## 2018-02-27 NOTE — PROGRESS NOTES
Surgical Oncology Consult       [address] [facility],[address]  [facility] [id number] [address]  [phone]    Adair Kira  1960  [id number]    Diagnoses and all orders for this visit:    Calculus of gallbladder without cholecystitis without obstruction  -     US gallbladder; Future    Other orders  -     docusate sodium (COLACE) 100 mg capsule; Take 100 mg by mouth          Chief Complaint   Patient presents with    New patient consultation     Pt is here for consult for dilated bile duct, referred by Dr Caio Nesbitt  She does report RUQ pain after eating fatty/greasy foods, but otherwise has no complaints  No history exists  History of Present Illness:   21-year-old female who initially in 2016 presented with right upper quadrant pain  At that time she had what appears to be a partial cholecystectomy  The LEON drain at that time was placed and reoperation was performed to remove it  She continues to have had intermittent right upper quadrant pain  This pain occurs when she eats fatty or greasy foods  It occurs 2 to 3 times a week  But she cannot completely attributes this based on her food intake  She underwent a sleeve gastrectomy in October 2017 and her symptoms have persisted  She has lost a significant amount of weight since surgery  Her echo is now normal  Her CT from June 8, 2017 reveals cholelithiasis and she is status post partial cholecystectomy  Ultrasound the gallbladder on December 6, 2017 revealed cholelithiasis and a common bile duct was slightly dilated  MRI with MRCP on January 9, 2018 revealed cholelithiasis  The common bile duct was dilated to 8 mm  There were no stones in the duct or obstructing mass  There was no intrahepatic biliary dilatation  I personally reviewed her films  She comes in now to discuss further therapy      Review of Systems  Complete ROS Surg Onc:   Constitutional: The patient denies new or recent history of general fatigue, no recent weight loss, no change in appetite  Eyes: No complaints of visual problems, no scleral icterus  ENT: no complaints of ear pain, no hoarseness, no difficulty swallowing,  no tinnitus and no new masses in head, oral cavity, or neck  Cardiovascular: No complaints of chest pain, no palpitations, no ankle edema  Respiratory: No complaints of shortness of breath, no cough  Gastrointestinal: No complaints of jaundice, no bloody stools, no pale stools  Genitourinary: No complaints of dysuria, no hematuria, no nocturia, no frequent urination, no urethral discharge  Musculoskeletal: No complaints of weakness, paralysis, joint stiffness or arthralgias  Integumentary: No complaints of rash, no new lesions  Neurological: No complaints of convulsions, no seizures, no dizziness  Hematologic/Lymphatic: No complaints of easy bruising  Endocrine:  No hot or cold intolerance  No polydipsia, polyphagia, or polyuria  Allergy/immunology:  No environmental allergies  No food allergies  Not immunocompromised  Skin:  No pallor or rash  No wound            Patient Active Problem List   Diagnosis    Morbid obesity due to excess calories (HCC)    Cholelithiasis    Retained gallstones following open cholecystectomy    Bariatric surgery status    S/P laparoscopic sleeve gastrectomy    Benign essential hypertension    [facility]    Benign neoplasm of cecum    Bladder infection, acute    Cardiomyopathy [facility]    Chronic systolic CHF (congestive heart failure) ([facility]    Hypothyroid    Paroxysmal ventricular tachycardia (HCC)    PVC (premature ventricular contraction)    Sleep apnea     Past Medical History:   Diagnosis Date    [facility]-fib ([facility] )     Anemia     CPAP (continuous positive airway pressure) dependence     Disease of thyroid gland     hypo    Gall stones     Hx of bleeding disorder     Hypertension     Irregular heart beat  Kidney stone     Sleep apnea     Wears glasses      Past Surgical History:   Procedure Laterality Date    CHOLECYSTECTOMY      partial    CYSTOSCOPY      ESOPHAGOGASTRODUODENOSCOPY N/A 8/21/2017    Procedure: ESOPHAGOGASTRODUODENOSCOPY (EGD); Surgeon: Kelsey Zuñiga MD;  Location: [facility];  Service: Bariatrics    LITHOTRIPSY      GA LAP, CLAUDIA RESTRICT PROC, LONGITUDINAL GASTRECTOMY N/A 8/21/2017    Procedure: Wes Nickel;  Surgeon: Kelsey Zuñiga MD;  Location: [facility];  Service: Bariatrics    REMOVAL URETERAL STENT Left     URETERAL STENT PLACEMENT Left      Family History   Problem Relation Age of Onset    Diabetes Mother     Hypertension Mother     Stomach cancer Mother     No Known Problems Father      Social History     Social History    Marital status: Single     Spouse name: N/A    Number of children: N/A    Years of education: N/A     Occupational History    Not on file       Social History Main Topics    Smoking status: Never Smoker    Smokeless tobacco: Never Used    Alcohol use No    Drug use: No    Sexual activity: Not on file     Other Topics Concern    Not on file     Social History Narrative    No narrative on file       Current Outpatient Prescriptions:     apixaban (ELIQUIS) 5 mg, Take 1 tablet by mouth 2 (two) times a day, Disp: , Rfl: 0    calcium citrate-vitamin d (CALCIUM CITRATE CHEWY BITE) 500-500 MG-UNIT CHEW chewable tablet, Chew, Disp: , Rfl:     carvedilol (COREG) 25 mg tablet, Take 12 5 mg by mouth 2 (two) times a day with meals  , Disp: , Rfl:     levothyroxine 200 mcg tablet, Take 200 mcg by mouth daily, Disp: , Rfl: 1    methylcellulose (MIRAFIBER) 500 mg tablet, Take 1,000 mg by mouth daily, Disp: , Rfl:     Multiple Vitamin (MULTIVITAMIN) tablet, Take 1 tablet by mouth daily, Disp: , Rfl:     omeprazole (PriLOSEC) 20 mg delayed release capsule, Take 1 capsule by mouth Daily, Disp: , Rfl:     Probiotic Product (PROBIOTIC DAILY PO), Take 1 capsule by mouth daily, Disp: , Rfl:     docusate sodium (COLACE) 100 mg capsule, Take 100 mg by mouth 2 (two) times a day, Disp: , Rfl:     docusate sodium (COLACE) 100 mg capsule, Take 100 mg by mouth, Disp: , Rfl:   Allergies   Allergen Reactions    Other Itching     tape  tape     Vitals:    02/27/18 1115   BP: 128/82   Resp: 16   Temp: 97 7 °F (36 5 °C)       Physical Exam   Constitutional: General appearance: The Patient is well-developed and well-nourished who appears the stated age in no acute distress  Patient is pleasant and talkative  HEENT:  Normocephalic  Sclerae are anicteric  Mucous membranes are moist  Neck is supple without adenopathy  No JVD  Chest: The lungs are clear to auscultation  Cardiac: Heart is regular rate  Abdomen: Abdomen is soft, non-tender, non-distended and without masses  Extremities: There is no clubbing or cyanosis  There is no edema  Symmetric  Neuro: Grossly nonfocal  Gait is normal      Lymphatic: No evidence of cervical adenopathy bilaterally  No evidence of axillary adenopathy bilaterally  No evidence of inguinal adenopathy bilaterally  Skin: Warm, anicteric  Psych:  Patient is pleasant and talkative  Imaging  No results found  I reviewed the above laboratory and imaging data  Discussion/Summary:  30-year-old female status post partial cholecystectomy with a gallstone seen in the gallbladder remnant/cystic duct  Her biliary dilatation is minimal   I suspect her pain is related to this stone in her duct becoming impacted intermittently  We had a long discussion regarding treatment options  One would include removal of the gallbladder remnant  If there is significant inflammation in the region, this may require biliary reconstruction  Another option would be short-term observation, and see if she can maximize her weight loss  With further weight loss, maybe her pain will become less frequent and bothersome  After some discussion she would like to try observation until she reaches her ideal weight  At that time she will re-evaluate her symptoms  I will repeat an ultrasound in 6 months to make sure the dilatation is not any worse  I will see her back at that time for another clinical exam   She is agreeable to this  All her questions were answered

## 2018-02-27 NOTE — LETTER
February 27, 2018     [address] [address]    Patient: Luca Vazquez   YOB: 1960   Date of Visit: 2/27/2018       Dear Dr Kendrick Nieves: Thank you for referring Luca Vazquez to me for evaluation  Below are my notes for this consultation  If you have questions, please do not hesitate to call me  I look forward to following your patient along with you  Sincerely,        Jere Hussein MD        CC: MD Monica Contreras MD Michial Lob, MD  2/27/2018 11:58 AM  Sign at close encounter               Surgical Oncology Consult       [address]  [phone]    Luca Vazquez  1960  [id number]    Diagnoses and all orders for this visit:    Calculus of gallbladder without cholecystitis without obstruction  -     US gallbladder; Future    Other orders  -     docusate sodium (COLACE) 100 mg capsule; Take 100 mg by mouth          Chief Complaint   Patient presents with    New patient consultation     Pt is here for consult for dilated bile duct, referred by Dr Nenita Gomez  She does report RUQ pain after eating fatty/greasy foods, but otherwise has no complaints  No history exists  History of Present Illness:   61-year-old female who initially in 2016 presented with right upper quadrant pain  At that time she had what appears to be a partial cholecystectomy  The LEON drain at that time was placed and reoperation was performed to remove it  She continues to have had intermittent right upper quadrant pain  This pain occurs when she eats fatty or greasy foods  It occurs 2 to 3 times a week  But she cannot completely attributes this based on her food intake  She underwent a sleeve gastrectomy in October 2017 and her symptoms have persisted  She has lost a significant amount of weight since surgery   Her echo is now normal  Her CT from June 8, 2017 reveals cholelithiasis and she is status post partial cholecystectomy  Ultrasound the gallbladder on December 6, 2017 revealed cholelithiasis and a common bile duct was slightly dilated  MRI with MRCP on January 9, 2018 revealed cholelithiasis  The common bile duct was dilated to 8 mm  There were no stones in the duct or obstructing mass  There was no intrahepatic biliary dilatation  I personally reviewed her films  She comes in now to discuss further therapy  Review of Systems  Complete ROS Surg Onc:   Constitutional: The patient denies new or recent history of general fatigue, no recent weight loss, no change in appetite  Eyes: No complaints of visual problems, no scleral icterus  ENT: no complaints of ear pain, no hoarseness, no difficulty swallowing,  no tinnitus and no new masses in head, oral cavity, or neck  Cardiovascular: No complaints of chest pain, no palpitations, no ankle edema  Respiratory: No complaints of shortness of breath, no cough  Gastrointestinal: No complaints of jaundice, no bloody stools, no pale stools  Genitourinary: No complaints of dysuria, no hematuria, no nocturia, no frequent urination, no urethral discharge  Musculoskeletal: No complaints of weakness, paralysis, joint stiffness or arthralgias  Integumentary: No complaints of rash, no new lesions  Neurological: No complaints of convulsions, no seizures, no dizziness  Hematologic/Lymphatic: No complaints of easy bruising  Endocrine:  No hot or cold intolerance  No polydipsia, polyphagia, or polyuria  Allergy/immunology:  No environmental allergies  No food allergies  Not immunocompromised  Skin:  No pallor or rash  No wound            Patient Active Problem List   Diagnosis    Morbid obesity due to excess calories ([address] )    Cholelithiasis    Retained gallstones following open cholecystectomy    Bariatric surgery status    S/P laparoscopic sleeve gastrectomy    Benign essential hypertension    Atrial fibrillation (HCC)    Benign neoplasm of cecum    Bladder infection, acute    Cardiomyopathy ([facility] )    Chronic systolic CHF (congestive heart failure) (HCC)    Hypothyroid    Paroxysmal ventricular tachycardia (HCC)    PVC (premature ventricular contraction)    Sleep apnea     Past Medical History:   Diagnosis Date    A-fib (HCC)     Anemia     CPAP (continuous positive airway pressure) dependence     Disease of thyroid gland     hypo    Gall stones     Hx of bleeding disorder     Hypertension     Irregular heart beat     Kidney stone     Sleep apnea     Wears glasses      Past Surgical History:   Procedure Laterality Date    CHOLECYSTECTOMY      partial    CYSTOSCOPY      ESOPHAGOGASTRODUODENOSCOPY N/A 8/21/2017    Procedure: ESOPHAGOGASTRODUODENOSCOPY (EGD); Surgeon: Renato Ruano MD;  [facility];  Service: Bariatrics    LITHOTRIPSY      NE LAP, CLAUDIA RESTRICT PROC, LONGITUDINAL GASTRECTOMY N/A 8/21/2017    Procedure: Jus Fernandes;  Surgeon: Renato Ruano MD;  [facility];  Service: Bariatrics    REMOVAL URETERAL STENT Left     URETERAL STENT PLACEMENT Left      Family History   Problem Relation Age of Onset    Diabetes Mother     Hypertension Mother     Stomach cancer Mother     No Known Problems Father      Social History     Social History    Marital status: Single     Spouse name: N/A    Number of children: N/A    Years of education: N/A     Occupational History    Not on file       Social History Main Topics    Smoking status: Never Smoker    Smokeless tobacco: Never Used    Alcohol use No    Drug use: No    Sexual activity: Not on file     Other Topics Concern    Not on file     Social History Narrative    No narrative on file       Current Outpatient Prescriptions:     apixaban (ELIQUIS) 5 mg, Take 1 tablet by mouth 2 (two) times a day, Disp: , Rfl: 0    calcium citrate-vitamin d (CALCIUM CITRATE CHEWY BITE) 500-500 MG-UNIT CHEW chewable tablet, Chew, Disp: , Rfl:     carvedilol (COREG) 25 mg tablet, Take 12 5 mg by mouth 2 (two) times a day with meals  , Disp: , Rfl:     levothyroxine 200 mcg tablet, Take 200 mcg by mouth daily, Disp: , Rfl: 1    methylcellulose (MIRAFIBER) 500 mg tablet, Take 1,000 mg by mouth daily, Disp: , Rfl:     Multiple Vitamin (MULTIVITAMIN) tablet, Take 1 tablet by mouth daily, Disp: , Rfl:     omeprazole (PriLOSEC) 20 mg delayed release capsule, Take 1 capsule by mouth Daily, Disp: , Rfl:     Probiotic Product (PROBIOTIC DAILY PO), Take 1 capsule by mouth daily, Disp: , Rfl:     docusate sodium (COLACE) 100 mg capsule, Take 100 mg by mouth 2 (two) times a day, Disp: , Rfl:     docusate sodium (COLACE) 100 mg capsule, Take 100 mg by mouth, Disp: , Rfl:   Allergies   Allergen Reactions    Other Itching     tape  tape     Vitals:    02/27/18 1115   BP: 128/82   Resp: 16   Temp: 97 7 °F (36 5 °C)       Physical Exam   Constitutional: General appearance: The Patient is well-developed and well-nourished who appears the stated age in no acute distress  Patient is pleasant and talkative  HEENT:  Normocephalic  Sclerae are anicteric  Mucous membranes are moist  Neck is supple without adenopathy  No JVD  Chest: The lungs are clear to auscultation  Cardiac: Heart is regular rate  Abdomen: Abdomen is soft, non-tender, non-distended and without masses  Extremities: There is no clubbing or cyanosis  There is no edema  Symmetric  Neuro: Grossly nonfocal  Gait is normal      Lymphatic: No evidence of cervical adenopathy bilaterally  No evidence of axillary adenopathy bilaterally  No evidence of inguinal adenopathy bilaterally  Skin: Warm, anicteric  Psych:  Patient is pleasant and talkative  Imaging  No results found  I reviewed the above laboratory and imaging data      Discussion/Summary:  29-year-old female status post partial cholecystectomy with a gallstone seen in the gallbladder remnant/cystic duct  Her biliary dilatation is minimal   I suspect her pain is related to this stone in her duct becoming impacted intermittently  We had a long discussion regarding treatment options  One would include removal of the gallbladder remnant  If there is significant inflammation in the region, this may require biliary reconstruction  Another option would be short-term observation, and see if she can maximize her weight loss  With further weight loss, maybe her pain will become less frequent and bothersome  After some discussion she would like to try observation until she reaches her ideal weight  At that time she will re-evaluate her symptoms  I will repeat an ultrasound in 6 months to make sure the dilatation is not any worse  I will see her back at that time for another clinical exam   She is agreeable to this  All her questions were answered

## 2018-03-01 LAB
VIT A SERPL-MCNC: 29 UG/DL (ref 20–65)
VIT B1 BLD-SCNC: 153.1 NMOL/L (ref 66.5–200)

## 2018-04-16 ENCOUNTER — OFFICE VISIT (OUTPATIENT)
Dept: BARIATRICS | Facility: CLINIC | Age: 58
End: 2018-04-16
Payer: COMMERCIAL

## 2018-04-16 VITALS
HEART RATE: 80 BPM | TEMPERATURE: 98.5 F | WEIGHT: 249 LBS | SYSTOLIC BLOOD PRESSURE: 122 MMHG | DIASTOLIC BLOOD PRESSURE: 70 MMHG | RESPIRATION RATE: 18 BRPM | BODY MASS INDEX: 45.82 KG/M2 | HEIGHT: 62 IN

## 2018-04-16 DIAGNOSIS — K91.2 POSTSURGICAL MALABSORPTION: ICD-10-CM

## 2018-04-16 DIAGNOSIS — Z98.84 BARIATRIC SURGERY STATUS: Primary | ICD-10-CM

## 2018-04-16 DIAGNOSIS — I10 BENIGN ESSENTIAL HYPERTENSION: ICD-10-CM

## 2018-04-16 DIAGNOSIS — E78.5 MILD HYPERLIPIDEMIA: ICD-10-CM

## 2018-04-16 PROCEDURE — 99214 OFFICE O/P EST MOD 30 MIN: CPT | Performed by: PHYSICIAN ASSISTANT

## 2018-04-16 RX ORDER — BIOTIN 1 MG
1000 TABLET ORAL 3 TIMES DAILY
COMMUNITY
End: 2021-10-01 | Stop reason: ALTCHOICE

## 2018-04-16 NOTE — PROGRESS NOTES
Assessment/Plan:    Bariatric surgery status  -s/p Vertical Sleeve Gastrectomy with Dr Juarez March on 8/12/2017  Overall doing Fairly well    Initial:  330 8 lb  Current: 249 lb  EWL:42%  [place]: Current  Current BMI is Body mass index is 45 25 kg/m²  Tolerating a regular diet-yes  Eating at least 60 grams of protein per day-yes  Following 30/60 minute rule with liquids-yes  Drinking at least 64 ounces of fluid per day-yes  Drinking carbonated beverages-no  Sufficient exercise-no  Using NSAIDs regularly-no  Using nicotine-no  Using alcohol-she tried it socially-advised on effect after gastric surgery and advised to avoid      Benign essential hypertension  BP is controlled -pulse ok on coreg  She was concerned she would not be able to get heart rate up to burn enough calories-advised that staying active and exercising should still help her and that rate control is important with her history of atrial fib but she can also discuss further with cardiologist    Retained gallstones following open cholecystectomy   2/1She has retained stone-saw -refer to Dr Jef Chappell note of  2/1/2018- was referred to Dr Karey Urena and he advised to follow-up with him in 6 months and she will have repeat Ultrasound and she will manage with low fat diet  She notes she has occasional discomfort but is not consistently following low fat diet-advised on importance of same    She knows if pain is associated with fever,chills, nausea/vomiting to let us know/go to ER    Postsurgical malabsorption  -At risk for malabsorption of vitamins/minerals secondary to malabsorption and restriction of intake from their procedure  -Currently taking adequate postop bariatric surgery vitamin supplementation-yes  -Last set of bariatric labs completed on 2/2918 and are not within acceptable limits   -Next set of bariatric labs ordered for approximately 6 months     She is taking 2 bariatric advantage Mvi and 3 calcium supplements, and biotin         Diagnoses and all orders for this visit:    Bariatric surgery status  -     Comprehensive metabolic panel; Future  -     CBC and differential; Future  -     Lipid panel; Future  -     Vitamin B12; Future  -     Vitamin A; Future  -     PTH, intact; Future  -     Vitamin B1, whole blood; Future  -     Vitamin D 25 hydroxy; Future  -     Folate; Future  -     Ferritin; Future    Benign essential hypertension  -     Comprehensive metabolic panel; Future  -     CBC and differential; Future  -     Lipid panel; Future  -     Vitamin B12; Future  -     Vitamin A; Future  -     PTH, intact; Future  -     Vitamin B1, whole blood; Future  -     Vitamin D 25 hydroxy; Future  -     Folate; Future  -     Ferritin; Future    Postsurgical malabsorption  -     Comprehensive metabolic panel; Future  -     CBC and differential; Future  -     Lipid panel; Future  -     Vitamin B12; Future  -     Vitamin A; Future  -     PTH, intact; Future  -     Vitamin B1, whole blood; Future  -     Vitamin D 25 hydroxy; Future  -     Folate; Future  -     Ferritin; Future    Mild hyperlipidemia  -     Lipid panel; Future    Other orders  -     Biotin 1000 MCG tablet; Take 1,000 mcg by mouth 3 (three) times a day          Subjective:      Patient ID: Trisha Lakhani is a 56 y o  female  She is here in routine follow-up  Tolerating a regular diet-trying to watch a lower fat diet but not consistent with same per patient  She is doing some walking  She is taking vitamins  She has no complaints today        The following portions of the patient's history were reviewed and updated as appropriate: allergies, current medications, past family history, past medical history, past social history, past surgical history and problem list     Review of Systems   Constitutional: Negative for chills and fever  Unexpected weight change: planned weight loss  Respiratory: Negative for shortness of breath and wheezing  Cardiovascular: Negative for chest pain and palpitations  Gastrointestinal: Negative for abdominal pain, constipation, diarrhea, nausea and vomiting  Psychiatric/Behavioral: Suicidal ideas: no complait of anxiety or depression  Objective:      /70   Pulse 80   Temp 98 5 °F (36 9 °C)   Resp 18   Ht 5' 2 2" (1 58 m)   Wt 113 kg (249 lb)   BMI 45 25 kg/m²          Physical Exam   Constitutional: She is oriented to person, place, and time  She appears well-developed and well-nourished  HENT:   Mouth/Throat: Oropharynx is clear and moist    Eyes: Conjunctivae are normal  No scleral icterus  Cardiovascular: Normal rate, regular rhythm and normal heart sounds  Pulmonary/Chest: Effort normal and breath sounds normal    Abdominal: Soft  There is no tenderness  No incisional hernias appreciated   Musculoskeletal:   Normal gait   Neurological: She is alert and oriented to person, place, and time  Psychiatric: She has a normal mood and affect  Her behavior is normal  Judgment and thought content normal    Nursing note and vitals reviewed  GOALS: Continued weight loss with good nutrition intakes    Normal vitamin and mineral levels  Exercise as tolerated    BARRIERS: none identified

## 2018-04-16 NOTE — ASSESSMENT & PLAN NOTE
BP is controlled -pulse ok on coreg   She was concerned she would not be able to get heart rate up to burn enough calories-advised that staying active and exercising should still help her and that rate control is important with her history of atrial fib but she can also discuss further with cardiologist

## 2018-04-16 NOTE — ASSESSMENT & PLAN NOTE
She has retained gallstone-refer to Dr Patty Garcia note of  2/1/2018- was referred to Dr Trey Up and he advised to follow-up with him in 6 months and she will have repeat Ultrasound and she will manage with low fat diet  She notes she has occasional discomfort but is not consistently following low fat diet-advised on importance of same    She knows if pain is associated with fever,chills, nausea/vomiting to let us know/go to ER

## 2018-04-16 NOTE — ASSESSMENT & PLAN NOTE
-At risk for malabsorption of vitamins/minerals secondary to malabsorption and restriction of intake from their procedure  -Currently taking adequate postop bariatric surgery vitamin supplementation-yes  -Last set of bariatric labs completed on 2/2918 and are not within acceptable limits   -Next set of bariatric labs ordered for approximately 6 months     She is taking 2 bariatric advantage Mvi and 3 calcium supplements, and biotin

## 2018-04-16 NOTE — ASSESSMENT & PLAN NOTE
-s/p Vertical Sleeve Gastrectomy with Dr Romain Rouse on 8/12/2017  Overall doing Fairly well    Initial:  330 8 lb  Current: 249 lb  EWL:42%  [place]: Current  Current BMI is Body mass index is 45 25 kg/m²      Tolerating a regular diet-yes  Eating at least 60 grams of protein per day-yes  Following 30/60 minute rule with liquids-yes  Drinking at least 64 ounces of fluid per day-yes  Drinking carbonated beverages-no  Sufficient exercise-no  Using NSAIDs regularly-no  Using nicotine-no  Using alcohol-she tried it socially-advised on effect after gastric surgery and advised to avoid

## 2018-05-17 ENCOUNTER — DOCUMENTATION (OUTPATIENT)
Dept: BARIATRICS | Facility: CLINIC | Age: 58
End: 2018-05-17

## 2018-05-17 NOTE — PROGRESS NOTES
Weight Management Nutrition Class     Diagnosis: Morbid Obesity    Bariatric Surgeon: Dr Regina Hussein    Surgery: Vertical Sleeve Gastrectomy    Class: 9 month post op note    Topics discussed today include:     fluid goals post op, protein goals post op, constipation, chew food well, exercise, avoidance of alcohol, PPI use, diet progression, hypoglycemia, dumping syndrome, protein supplems, vitamin/mineral supplements and calcium supplements    Patient was able to verbalize basic diet (protein, fluid, vitamin and mineral) recommendations and possible nutrition-related complications  Yes     Attended 9 month follow up meeting  Addressed both behavioral and dietary issues  Group discussion included the impact of tobacco use, alcohol use, psychiatric medications, relationships, body image and self esteem issues as it pertains to the weight loss surgery patient  During group discussion, reviewed vitamin recommendations, protein recommendations, portion sizes, balancing diet to include healthy carbohydrates, importance of exercise, and the bariatric rules for success   Overall pleased with weight loss and improved quality of life

## 2018-05-18 ENCOUNTER — OFFICE VISIT (OUTPATIENT)
Dept: BARIATRICS | Facility: CLINIC | Age: 58
End: 2018-05-18

## 2018-05-18 VITALS — WEIGHT: 248.1 LBS | BODY MASS INDEX: 45.66 KG/M2 | HEIGHT: 62 IN

## 2018-05-18 DIAGNOSIS — Z98.84 S/P LAPAROSCOPIC SLEEVE GASTRECTOMY: ICD-10-CM

## 2018-05-18 DIAGNOSIS — E66.01 MORBID (SEVERE) OBESITY DUE TO EXCESS CALORIES (HCC): Primary | ICD-10-CM

## 2018-05-18 PROCEDURE — RECHECK

## 2018-05-18 NOTE — PROGRESS NOTES
Patient seeking motivation to do what she needs to do  Patient reports she is having difficulty staying motivated to exercise and keep from snacking  Patient is losing her job although the end date is not yet known  Encouraged patient to reach out at [facility] to find exercise melissa or someone to text her each day about her activity level and hold her accountable  Patient expressed concerns about low heart rate and difficulty getting heart rate up when exercising  Encouraged patient to contact cardiologist and discuss  Patient to set and write down weekly goals and find exercise melissa and/or friend to hold her accountable for exercise  Patient will be seen in a month

## 2018-05-18 NOTE — PROGRESS NOTES
Bariatric Follow Up Nutrition Note    Type of surgery  Vertical sleeve gastrectomy  Surgery Date: 8/21/2017  9 months  post-op  Surgeon: Dr Julio César Tam  56 y o   female  Height 5' 2 2" (1 58 m), weight 113 kg (248 lb 1 6 oz)  Body mass index is 45 09 kg/m²  Weight on Day of Weight Loss Surgery: 292 2lbs  Weight in (lb) to have BMI = 25: 137 6lbs  Pre-Op Excess Wt: 193 2lbs  Post-Op Wt Loss: 82 7#/ 43% EBWL in 9 month(s)    Review of History and Medications   Past Medical History:   Diagnosis Date    A-fib ([address] )     Anemia     CPAP (continuous positive airway pressure) dependence     Disease of thyroid gland     hypo    Gall stones     Hx of bleeding disorder     Hypertension     Irregular heart beat     Kidney stone     Sleep apnea     Wears glasses      Past Surgical History:   Procedure Laterality Date    CHOLECYSTECTOMY      partial    CYSTOSCOPY      ESOPHAGOGASTRODUODENOSCOPY N/A 8/21/2017    Procedure: ESOPHAGOGASTRODUODENOSCOPY (EGD);   Surgeon: Manohar Lemons MD;  [facility];  Service: Bariatrics    LITHOTRIPSY      [place] LAP, CLAUDIA RESTRICT PROC, LONGITUDINAL GASTRECTOMY N/A 8/21/2017    Procedure: Hillery Knife;  Surgeon: Manohar Lemons MD;  [facility];  Service: Bariatrics    REMOVAL URETERAL STENT Left     URETERAL STENT PLACEMENT Left      Social History     Social History    Marital status: Single     Spouse name: N/A    Number of children: N/A    Years of education: N/A     Social History Main Topics    Smoking status: Never Smoker    Smokeless tobacco: Never Used    Alcohol use No    Drug use: No    Sexual activity: Not on file     Other Topics Concern    Not on file     Social History Narrative    No narrative on file       Current Outpatient Prescriptions:     apixaban (ELIQUIS) 5 mg, Take 1 tablet by mouth 2 (two) times a day, Disp: , Rfl: 0    Biotin 1000 MCG tablet, Take 1,000 mcg by mouth 3 (three) times a day, Disp: , Rfl:     calcium citrate-vitamin d (CALCIUM CITRATE CHEWY BITE) 500-500 MG-UNIT CHEW chewable tablet, Chew, Disp: , Rfl:     carvedilol (COREG) 25 mg tablet, Take 12 5 mg by mouth 2 (two) times a day with meals  , Disp: , Rfl:     docusate sodium (COLACE) 100 mg capsule, Take 100 mg by mouth, Disp: , Rfl:     levothyroxine 200 mcg tablet, Take 200 mcg by mouth daily, Disp: , Rfl: 1    methylcellulose (MIRAFIBER) 500 mg tablet, Take 1,000 mg by mouth daily, Disp: , Rfl:     Multiple Vitamin (MULTIVITAMIN) tablet, Take 1 tablet by mouth daily, Disp: , Rfl:     omeprazole (PriLOSEC) 20 mg delayed release capsule, Take 1 capsule by mouth Daily, Disp: , Rfl:     Probiotic Product (PROBIOTIC DAILY PO), Take 1 capsule by mouth daily, Disp: , Rfl:     Food Intake and Lifestyle Assessment   Food Intake Assessment completed via food log brought by patient  Breakfast: fat free low sugar greek yogurt and protein drink  Snack: pt snacks on nuts throughout the day   Lunch: one slice meat, one slice cheese  Snack: same: nuts  Dinner: one ounce meat, vegetables  Snack: apple with peanut butter  Beverage intake: water and juice  Diet texture/stage: regular  Protein supplement: Premier Protein or JBN once daily  Estimated protein intake per day: 80-110g  Estimated fluid intake per day: 48-64oz  Meals eaten away from home: 0  Typical meal pattern: 3 meals per day and multiple snacks per day  Eating Behaviors: Consumption of high calorie/ high fat foods, Frequent snacking/ grazing and Mindless eating    Food allergies or intolerances: none  Cultural or Islam considerations: none    Physical Assessment  Nutrition Related Findings  obesity    Physical Activity  Types of exercise: None  Pt reports she walked twice last week with her friend, but did not continue because she finds it boring    Current physical limitations: none    Psychosocial Assessment   Support systems: friend(s)  Socioeconomic factors: none noted    Nutrition Diagnosis  Diagnosis: Overweight / Obesity (NC-3 3), Excessive energy intake (NI-1 5) and Altered GI function (NC-1 4)  Related to: Limited adherence to nutrition-related recommendations, Physical inactivity, Excessive energy intake and Altered GI function  As Evidenced by: BMI >25, Expected anthropometric outcomes are not achieved, Excessive energy intake and Excessive fat / cholesterol intake     Interventions and Teaching   Patient educated on post-op nutrition guidelines  Patient educated and handouts provided    Adequate hydration  Sugar and fat restriction to decrease "dumping syndrome"  Expected weight loss  Exercise  Nutrition considerations after surgery  Appropriate carbohydrate, protein, and fat intake, and food/fluid choices to maximize safe weight loss, nutrient intake, and tolerance   Possible problems with poor eating habits  Techniques for self monitoring and keeping daily food journal    Education provided to: patient    Barriers to learning: Desire/Motivation    Readiness to change: action    Comprehension: needs reinforcement and verbalizes understanding     Expected Compliance: good    Goals  Eliminate sugar sweetened beverages, Food journal, Exercise 30 minutes 5 times per week, Eat 3 meals per day, Eliminate mindless snacking and sesion goals include: 1) Try at least one new form of physical activity, 2)  At least one week of COMPLETE and ACCURATE food journals     Time Spent:   30 Minutes

## 2018-06-22 ENCOUNTER — OFFICE VISIT (OUTPATIENT)
Dept: BARIATRICS | Facility: CLINIC | Age: 58
End: 2018-06-22

## 2018-06-22 VITALS — HEIGHT: 62 IN | WEIGHT: 249.1 LBS | BODY MASS INDEX: 45.84 KG/M2

## 2018-06-22 DIAGNOSIS — E66.01 MORBID OBESITY DUE TO EXCESS CALORIES (HCC): Primary | ICD-10-CM

## 2018-06-22 DIAGNOSIS — Z98.84 S/P LAPAROSCOPIC SLEEVE GASTRECTOMY: ICD-10-CM

## 2018-06-22 PROCEDURE — RECHECK

## 2018-06-22 NOTE — PROGRESS NOTES
Bariatric Follow Up Nutrition Note    Type of surgery  Vertical sleeve gastrectomy  Surgery Date: 8/21/2017  10 months  post-op  Surgeon: Dr Denis More  56 y o   female  Height 5' 2 2" (1 58 m), weight 113 kg (249 lb 1 6 oz)  Body mass index is 45 27 kg/m²  Weight on Day of Weight Loss Surgery: 292 2lbs  Weight in (lb) to have BMI = 25: 137 6lbs  Pre-Op Excess Wt: 193 2lbs  Post-Op Wt Loss: 81 8#/ 42% EBWL in 10 month(s)    Review of History and Medications   Past Medical History:   Diagnosis Date    A-fib ([address] )     Anemia     CPAP (continuous positive airway pressure) dependence     Disease of thyroid gland     hypo    Gall stones     Hx of bleeding disorder     Hypertension     Irregular heart beat     Kidney stone     Sleep apnea     Wears glasses      Past Surgical History:   Procedure Laterality Date    CHOLECYSTECTOMY      partial    CYSTOSCOPY      ESOPHAGOGASTRODUODENOSCOPY N/A 8/21/2017    Procedure: ESOPHAGOGASTRODUODENOSCOPY (EGD);   Surgeon: Belkis Lima MD;  [facility]: [facility];  Service: Bariatrics    LITHOTRIPSY      NM LAP, CLAUDIA RESTRICT PROC, LONGITUDINAL GASTRECTOMY N/A 8/21/2017    Procedure: Wendelin Conception;  Surgeon: Belkis Lima MD;  [facility];  Service: Bariatrics    REMOVAL URETERAL STENT Left     URETERAL STENT PLACEMENT Left      Social History     Social History    Marital status: Single     Spouse name: N/A    Number of children: N/A    Years of education: N/A     Social History Main Topics    Smoking status: Never Smoker    Smokeless tobacco: Never Used    Alcohol use No    Drug use: No    Sexual activity: Not on file     Other Topics Concern    Not on file     Social History Narrative    No narrative on file       Current Outpatient Prescriptions:     apixaban (ELIQUIS) 5 mg, Take 1 tablet by mouth 2 (two) times a day, Disp: , Rfl: 0    Biotin 1000 MCG tablet, Take 1,000 mcg by mouth 3 (three) times a day, Disp: , Rfl:     calcium citrate-vitamin d (CALCIUM CITRATE CHEWY BITE) 500-500 MG-UNIT CHEW chewable tablet, Chew, Disp: , Rfl:     carvedilol (COREG) 25 mg tablet, Take 12 5 mg by mouth 2 (two) times a day with meals  , Disp: , Rfl:     docusate sodium (COLACE) 100 mg capsule, Take 100 mg by mouth, Disp: , Rfl:     levothyroxine 200 mcg tablet, Take 200 mcg by mouth daily, Disp: , Rfl: 1    methylcellulose (MIRAFIBER) 500 mg tablet, Take 1,000 mg by mouth daily, Disp: , Rfl:     Multiple Vitamin (MULTIVITAMIN) tablet, Take 1 tablet by mouth daily, Disp: , Rfl:     omeprazole (PriLOSEC) 20 mg delayed release capsule, Take 1 capsule by mouth Daily, Disp: , Rfl:     Probiotic Product (PROBIOTIC DAILY PO), Take 1 capsule by mouth daily, Disp: , Rfl:     Food Intake and Lifestyle Assessment   Food Intake Assessment completed via usual diet recall  Breakfast: yogurt with fruit  Snack: none   Lunch: salad with chicken  Snack: none  Dinner: chicken or turkey, vegetables  Snack: protein drink or protein bar  Beverage intake: water  Diet texture/stage: regular  Protein supplement: Optimum protein or Premier Protein drink, Think Thin bars  Estimated protein intake per day: 60g  Estimated fluid intake per day: 64+oz  Meals eaten away from home: 0  Typical meal pattern: 3 meals per day and 1 snacks per day  Eating Behaviors: Appropriate diet advancement, Appropriate portion sizes and Does not drink with meals and waits 30-minutes after meal before resuming drinking    Food allergies or intolerances: none  Cultural or Congregational considerations: none    Physical Assessment  Nutrition Related Findings  none    Physical Activity  Types of exercise: Walking  Current physical limitations: knee pain    Psychosocial Assessment   Support systems: friend(s)  Socioeconomic factors: none    Nutrition Diagnosis  Diagnosis: Overweight / Obesity (NC-3 3) and Altered GI function (NC-1 4)  Related to: Physical inactivity and Altered GI function  As Evidenced by: BMI >25 and Expected anthropometric outcomes are not achieved     Interventions and Teaching   Patient educated on post-op nutrition guidelines  Patient educated and handouts provided  Capacity of post-surgery stomach  Adequate hydration  Sugar and fat restriction to decrease "dumping syndrome"  Expected weight loss  Weight loss plateaus/ possibility of weight regain  Exercise  Nutrition considerations after surgery  Protein supplements  Meal planning and preparation  Appropriate carbohydrate, protein, and fat intake, and food/fluid choices to maximize safe weight loss, nutrient intake, and tolerance   Dietary and lifestyle changes  Techniques for self monitoring and keeping daily food journal  Potential for food intolerance after surgery, and ways to deal with them including: lactose intolerance, nausea, reflux, vomiting, diarrhea, food intolerance, appetite changes, gas    Education provided to: patient    Barriers to learning: Desire/Motivation    Readiness to change: action and monitoring    Comprehension: verbalizes understanding     Expected Compliance: good    Goals  Food journal, Exercise 30 minutes 5 times per week and Eat 3 meals per day     Pt is 10 months post-op sleeve, suboptimal weight loss  Pt was measuring and weighing her food and food journaling after last session, but then traveled for work which threw her out of her routine and she regressed  Pt struggles with planning for physical activity as well  Developed meal plan with pt with three meals and one snack per day, each 4 hours apart  Discussed some snack suggestions  Discussed tips for increasing daily steps, goal of 5,000 steps per day  Also discussed average weight loss results, barriers to exercise, and honeymoon period    Time Spent:   30 Minutes

## 2018-07-03 ENCOUNTER — TELEPHONE (OUTPATIENT)
Dept: BARIATRICS | Facility: CLINIC | Age: 58
End: 2018-07-03

## 2018-07-03 NOTE — TELEPHONE ENCOUNTER
Patient called to report that she feels a burning sensation at the back of her throat and thinks it can be acid reflux  She is currently taking Omeprazole once daily  I told her to increase her dose to twice daily and if symptoms persist or worsen she is to call the office  Her follow up appointment is next month

## 2018-07-27 ENCOUNTER — OFFICE VISIT (OUTPATIENT)
Dept: BARIATRICS | Facility: CLINIC | Age: 58
End: 2018-07-27

## 2018-07-27 VITALS — WEIGHT: 247.1 LBS | BODY MASS INDEX: 45.47 KG/M2 | HEIGHT: 62 IN

## 2018-07-27 DIAGNOSIS — E66.01 MORBID OBESITY DUE TO EXCESS CALORIES (HCC): Primary | ICD-10-CM

## 2018-07-27 PROCEDURE — RECHECK

## 2018-07-27 NOTE — PROGRESS NOTES
Patient tearful today as she had to put her cat to sleep this week  Discussed loss and grief; patient has supportive group of friends who are calling; "Some really understand he was my baby " Spent weekend in [facility] with girlfriends; did well with steps; 9300 in a day  Patient's goal is to join [facility]  [employer]  She does well when she travels for work or pleasure but doesn't have a routine for activity each day when she is at home  Patient stated she will stop at [employer] when she leaves here to visit and see about membership  Scheduled to see patient next month

## 2018-08-22 ENCOUNTER — HOSPITAL ENCOUNTER (OUTPATIENT)
Dept: RADIOLOGY | Facility: MEDICAL CENTER | Age: 58
Discharge: HOME | End: 2018-08-22
Payer: COMMERCIAL

## 2018-08-22 DIAGNOSIS — K80.20 CALCULUS OF GALLBLADDER WITHOUT CHOLECYSTITIS WITHOUT OBSTRUCTION: ICD-10-CM

## 2018-08-22 PROCEDURE — 76705 ECHO EXAM OF ABDOMEN: CPT

## 2018-08-24 ENCOUNTER — OFFICE VISIT (OUTPATIENT)
Dept: BARIATRICS | Facility: CLINIC | Age: 58
End: 2018-08-24

## 2018-08-24 DIAGNOSIS — E66.01 MORBID OBESITY DUE TO EXCESS CALORIES (HCC): Primary | ICD-10-CM

## 2018-08-24 PROCEDURE — RECHECK

## 2018-08-24 NOTE — PROGRESS NOTES
Patient here for follow up visit  Continues to struggle with her exercise routine  This week got [employer] and has done the program twice  States she is good with 30/60 rule  Work is taking a great deal of emotional energy to be professional and do the work  Contract now extended to March, '19, however, leaves with little energy to do much else  Still grieving loss of her pet cat  Has looked at another cat; thinking about it  Question whether patient is depressed? Seems to have little interest in things; continue to monitor  Goals for this month: 1  Patient to exercise 5 x's a week, 30 minutes  2  Keep accurate food journal to discuss with LS next visit  Patient to meet with LS next month

## 2018-08-27 PROBLEM — N20.0 KIDNEY STONE: Status: RESOLVED | Noted: 2018-08-27 | Resolved: 2018-08-27

## 2018-08-27 PROBLEM — Z99.89 CPAP (CONTINUOUS POSITIVE AIRWAY PRESSURE) DEPENDENCE: Status: ACTIVE | Noted: 2018-08-27

## 2018-08-28 ENCOUNTER — OFFICE VISIT (OUTPATIENT)
Dept: SURGICAL ONCOLOGY | Facility: CLINIC | Age: 58
End: 2018-08-28
Payer: COMMERCIAL

## 2018-08-28 VITALS
TEMPERATURE: 97.8 F | HEIGHT: 62 IN | SYSTOLIC BLOOD PRESSURE: 126 MMHG | BODY MASS INDEX: 46.38 KG/M2 | RESPIRATION RATE: 16 BRPM | WEIGHT: 252 LBS | DIASTOLIC BLOOD PRESSURE: 82 MMHG | HEART RATE: 112 BPM

## 2018-08-28 DIAGNOSIS — K80.20 CALCULUS OF GALLBLADDER WITHOUT CHOLECYSTITIS WITHOUT OBSTRUCTION: Primary | ICD-10-CM

## 2018-08-28 DIAGNOSIS — K91.86: ICD-10-CM

## 2018-08-28 PROCEDURE — 99213 OFFICE O/P EST LOW 20 MIN: CPT | Performed by: SURGERY

## 2018-08-28 NOTE — LETTER
August 28, 2018     [address] [address]    Patient: Jared Bay   YOB: 1960   Date of Visit: 8/28/2018       Dear Dr Haresh Franz: Thank you for referring Jared Bay to me for evaluation  Below are my notes for this consultation  If you have questions, please do not hesitate to call me  I look forward to following your patient along with you  Sincerely,        Kristine Gutierrez MD        CC: MD Sunita Delacruz MD Juliann Munroe, MD  8/28/2018 11:32 AM  Sign at close encounter               Surgical Oncology Follow Up       [address]  1960  [id number]  [address]  [facility] [address] [address]    Diagnoses and all orders for this visit:    Calculus of gallbladder without cholecystitis without obstruction  -     Ambulatory referral to General Surgery; Future    Retained gallstones following open cholecystectomy  -     Ambulatory referral to General Surgery; Future        Chief Complaint   Patient presents with    Follow-up     Pt is here for 6 mo f/u with US  Return if symptoms worsen or fail to improve  No history exists  History of Present Illness:   Patient returns in follow-up of her retained gallstone following cholecystectomy  She initially in 2016 presented with right upper quadrant pain  At that time she had what appears to be a partial cholecystectomy  The LEON drain at that time was placed and reoperation was performed to remove it  She continues to have had intermittent right upper quadrant pain  She also has pain when she eats fatty food, but occasionally when she does not eat anything  Pain seems to becoming less frequent than it was 6 months ago  Her weight has stabilized    Follow-up ultrasound on August 22, 2018 reveals a gallbladder that is being read as normal  There is a 2 1 cm calculus lodged in the neck of the gallbladder  Common bile duct was 7 mm and is stable  I personally reviewed the films  She comes in now to discuss further therapy  Review of Systems  Complete ROS Surg Onc:   Complete ROS Surg Onc:   Constitutional: The patient denies new or recent history of general fatigue, no recent weight loss, no change in appetite  Eyes: No complaints of visual problems, no scleral icterus  ENT: no complaints of ear pain, no hoarseness, no difficulty swallowing,  no tinnitus and no new masses in head, oral cavity, or neck  Cardiovascular: No complaints of chest pain, no palpitations, no ankle edema  Respiratory: No complaints of shortness of breath, no cough  Gastrointestinal: No complaints of jaundice, no bloody stools, no pale stools  Genitourinary: No complaints of dysuria, no hematuria, no nocturia, no frequent urination, no urethral discharge  Musculoskeletal: No complaints of weakness, paralysis, joint stiffness or arthralgias  Integumentary: No complaints of rash, no new lesions  Neurological: No complaints of convulsions, no seizures, no dizziness  Hematologic/Lymphatic: No complaints of easy bruising  Endocrine:  No hot or cold intolerance  No polydipsia, polyphagia, or polyuria  Allergy/immunology:  No environmental allergies  No food allergies  Not immunocompromised  Skin:  No pallor or rash  No wound          Patient Active Problem List   Diagnosis    Morbid obesity due to excess calories ([address] )    Cholelithiasis    Retained gallstones following open cholecystectomy    Bariatric surgery status    S/P laparoscopic sleeve gastrectomy    Benign essential hypertension    Atrial fibrillation (HCC)    Benign neoplasm of cecum    Bladder infection, acute    Cardiomyopathy ([address] )    Chronic systolic CHF (congestive heart failure) ([address] )    Hypothyroid    Paroxysmal ventricular tachycardia (HCC)    PVC (premature ventricular contraction)    Sleep apnea    Postsurgical malabsorption    CPAP (continuous positive airway pressure) dependence     Past Medical History:   Diagnosis Date    Anemia     CPAP (continuous positive airway pressure) dependence     Hx of bleeding disorder     Kidney stone     Sleep apnea     Wears glasses      Past Surgical History:   Procedure Laterality Date    CHOLECYSTECTOMY      partial    CYSTOSCOPY      ESOPHAGOGASTRODUODENOSCOPY N/A 8/21/2017    Procedure: ESOPHAGOGASTRODUODENOSCOPY (EGD); Surgeon: Regina Hussein MD;  [facility];  Service: Bariatrics    LITHOTRIPSY      CT LAP, CLAUDIA RESTRICT PROC, LONGITUDINAL GASTRECTOMY N/A 8/21/2017    Procedure: Ignacio Castillo;  Surgeon: Regina Hussein MD;  [facility];  Service: Bariatrics    REMOVAL URETERAL STENT Left     URETERAL STENT PLACEMENT Left      Family History   Problem Relation Age of Onset    Diabetes Mother     Hypertension Mother     Stomach cancer Mother     No Known Problems Father      Social History     Social History    Marital status: Single     Spouse name: N/A    Number of children: N/A    Years of education: N/A     Occupational History    Not on file       Social History Main Topics    Smoking status: Never Smoker    Smokeless tobacco: Never Used    Alcohol use No    Drug use: No    Sexual activity: Not on file     Other Topics Concern    Not on file     Social History Narrative    No narrative on file       Current Outpatient Prescriptions:     apixaban (ELIQUIS) 5 mg, Take 1 tablet by mouth 2 (two) times a day, Disp: , Rfl: 0    Biotin 1000 MCG tablet, Take 1,000 mcg by mouth 3 (three) times a day, Disp: , Rfl:     calcium citrate-vitamin d (CALCIUM CITRATE CHEWY BITE) 500-500 MG-UNIT CHEW chewable tablet, Chew, Disp: , Rfl:     carvedilol (COREG) 25 mg tablet, Take 12 5 mg by mouth 2 (two) times a day with meals  , Disp: , Rfl:     docusate sodium (COLACE) 100 mg capsule, Take 100 mg by mouth, Disp: , Rfl:     levothyroxine 200 mcg tablet, Take 200 mcg by mouth daily, Disp: , Rfl: 1    methylcellulose (MIRAFIBER) 500 mg tablet, Take 1,000 mg by mouth daily, Disp: , Rfl:     Multiple Vitamin (MULTIVITAMIN) tablet, Take 1 tablet by mouth daily, Disp: , Rfl:     omeprazole (PriLOSEC) 20 mg delayed release capsule, Take 1 capsule by mouth Daily, Disp: , Rfl:     Probiotic Product (PROBIOTIC DAILY PO), Take 1 capsule by mouth daily, Disp: , Rfl:   Allergies   Allergen Reactions    Other Itching     tape  tape     Vitals:    08/28/18 1110   BP: 126/82   Pulse: (!) 112   Resp: 16   Temp: 97 8 °F (36 6 °C)       Physical Exam  Constitutional: General appearance: The Patient is well-developed and well-nourished who appears the stated age in no acute distress  Patient is pleasant and talkative  HEENT:  Normocephalic  Sclerae are anicteric  Mucous membranes are moist  Neck is supple without adenopathy  No JVD  Chest: The lungs are clear to auscultation  Cardiac: Heart is regular rate  Abdomen: Abdomen is soft, non-tender, non-distended and without masses  Extremities: There is no clubbing or cyanosis  There is no edema  Symmetric  Neuro: Grossly nonfocal  Gait is normal      Lymphatic: No evidence of cervical adenopathy bilaterally  No evidence of axillary adenopathy bilaterally  Skin: Warm, anicteric  Psych:  Patient is pleasant and talkative  Breasts:        Pathology:      Labs:      Imaging  Us Gallbladder    Result Date: 8/23/2018  Narrative: RIGHT UPPER QUADRANT ULTRASOUND INDICATION:     K80 20: Calculus of gallbladder without cholecystitis without obstruction  Follow-up  COMPARISON:  MRI 1/9/2018  Ultrasound 12/6/2017   TECHNIQUE:   Real-time ultrasound of the right upper quadrant was performed with a curvilinear transducer with both volumetric sweeps and still imaging techniques  FINDINGS: PANCREAS:  Visualized portions of the pancreas are within normal limits  AORTA AND IVC:  Visualized portions are normal for patient age  LIVER: Size:  Within normal range  The liver measures 17 cm in the midclavicular line  Contour:  Surface contour is smooth  Parenchyma:  Echogenicity and echotexture are within normal limits  No evidence of suspicious mass  Limited imaging of the main portal vein shows it to be patent and hepatopetal   BILIARY: The gallbladder is normal in caliber  No wall thickening or pericholecystic fluid  There are multiple calculi, including a 2 1 cm calculus lodged in the neck  No sonographic Cheney sign  No intrahepatic biliary dilatation  CBD measures 7 mm, unchanged since the prior exams  No choledocholithiasis  KIDNEY: Right kidney measures 11 8 cm  Within normal limits  ASCITES:   None  Impression: Cholelithiasis, with no evidence of acute cholecystitis  Unchanged borderline enlarged common bile duct, with no evidence of choledocholithiasis  No intrahepatic biliary dilatation  Workstation performed: [id number]     I reviewed the above laboratory and imaging data  Discussion/Summary:   29-year-old female status post partial cholecystectomy with a gallstone seen in gallbladder remnant/cystic duct  She has stable minimal biliary dilatation  Once again suspect that her pain is related to the stone in her duct becoming intermittently impacted or continue was passage of small stones  I think at this time she should probably have her gallbladder remnant removed  I do not think she would require any significant biliary reconstruction since her bile duct does not appear abnormally dilated  Since her weight has been stable eyes, and she still has the symptoms I think surgery would be appropriate at this time    I will have her set up to see 1 of the general surgeons to discuss this as I do not think that significant biliary reconstruction will be required  If that is the case, I will be of help  She is agreeable to this  All her questions were answered

## 2018-08-28 NOTE — PROGRESS NOTES
Surgical Oncology Follow Up       [address] [place] [address] SURGICAL ONCOLOGY [address]  1960  [id number]  [address] [place] [address] SURGICAL ONCOLOGY [place] [phone]    Diagnoses and all orders for this visit:    Calculus of gallbladder without cholecystitis without obstruction  -     Ambulatory referral to General Surgery; Future    Retained gallstones following open cholecystectomy  -     Ambulatory referral to General Surgery; Future        Chief Complaint   Patient presents with    Follow-up     Pt is here for 6 mo f/u with US  Return if symptoms worsen or fail to improve  No history exists  History of Present Illness:   Patient returns in follow-up of her retained gallstone following cholecystectomy  She initially in 2016 presented with right upper quadrant pain  At that time she had what appears to be a partial cholecystectomy  The LEON drain at that time was placed and reoperation was performed to remove it  She continues to have had intermittent right upper quadrant pain  She also has pain when she eats fatty food, but occasionally when she does not eat anything  Pain seems to becoming less frequent than it was 6 months ago  Her weight has stabilized  Follow-up ultrasound on August 22, 2018 reveals a gallbladder that is being read as normal  There is a 2 1 cm calculus lodged in the neck of the gallbladder  Common bile duct was 7 mm and is stable  I personally reviewed the films  She comes in now to discuss further therapy  Review of Systems  Complete ROS Surg Onc:   Complete ROS Surg Onc:   Constitutional: The patient denies new or recent history of general fatigue, no recent weight loss, no change in appetite  Eyes: No complaints of visual problems, no scleral icterus     ENT: no complaints of ear pain, no hoarseness, no difficulty swallowing,  no tinnitus and no new masses in head, oral cavity, or neck  Cardiovascular: No complaints of chest pain, no palpitations, no ankle edema  Respiratory: No complaints of shortness of breath, no cough  Gastrointestinal: No complaints of jaundice, no bloody stools, no pale stools  Genitourinary: No complaints of dysuria, no hematuria, no nocturia, no frequent urination, no urethral discharge  Musculoskeletal: No complaints of weakness, paralysis, joint stiffness or arthralgias  Integumentary: No complaints of rash, no new lesions  Neurological: No complaints of convulsions, no seizures, no dizziness  Hematologic/Lymphatic: No complaints of easy bruising  Endocrine:  No hot or cold intolerance  No polydipsia, polyphagia, or polyuria  Allergy/immunology:  No environmental allergies  No food allergies  Not immunocompromised  Skin:  No pallor or rash  No wound          Patient Active Problem List   Diagnosis    Morbid obesity due to excess calories (HCC)    Cholelithiasis    Retained gallstones following open cholecystectomy    Bariatric surgery status    S/P laparoscopic sleeve gastrectomy    Benign essential hypertension    Atrial fibrillation [facility] [facility]    Benign neoplasm of cecum    Bladder infection, acute    Cardiomyopathy ([facility] [address] [facility]    Chronic systolic CHF (congestive heart failure) ([facility] [address] [facility]    Hypothyroid    Paroxysmal ventricular tachycardia (HCC)    PVC (premature ventricular contraction)    Sleep apnea    Postsurgical malabsorption    CPAP (continuous positive airway pressure) dependence     Past Medical History:   Diagnosis Date    Anemia     CPAP (continuous positive airway pressure) dependence     Hx of bleeding disorder     Kidney stone     Sleep apnea     Wears glasses      Past Surgical History:   Procedure Laterality Date    CHOLECYSTECTOMY      partial    CYSTOSCOPY      ESOPHAGOGASTRODUODENOSCOPY N/A 8/21/2017    Procedure: ESOPHAGOGASTRODUODENOSCOPY (EGD); Surgeon: Yeni Candelaria MD;  [facility];  Service: Bariatrics    LITHOTRIPSY      [place] LAP, CLAUDIA RESTRICT PROC, LONGITUDINAL GASTRECTOMY N/A 8/21/2017    Procedure: Chloe Villa;  Surgeon: Yeni Candelaria MD;  [facility];  Service: Bariatrics    REMOVAL URETERAL STENT Left     URETERAL STENT PLACEMENT Left      Family History   Problem Relation Age of Onset    Diabetes Mother     Hypertension Mother     Stomach cancer Mother     No Known Problems Father      Social History     Social History    Marital status: Single     Spouse name: N/A    Number of children: N/A    Years of education: N/A     Occupational History    Not on file       Social History Main Topics    Smoking status: Never Smoker    Smokeless tobacco: Never Used    Alcohol use No    Drug use: No    Sexual activity: Not on file     Other Topics Concern    Not on file     Social History Narrative    No narrative on file       Current Outpatient Prescriptions:     apixaban (ELIQUIS) 5 mg, Take 1 tablet by mouth 2 (two) times a day, Disp: , Rfl: 0    Biotin 1000 MCG tablet, Take 1,000 mcg by mouth 3 (three) times a day, Disp: , Rfl:     calcium citrate-vitamin d (CALCIUM CITRATE CHEWY BITE) 500-500 MG-UNIT CHEW chewable tablet, Chew, Disp: , Rfl:     carvedilol (COREG) 25 mg tablet, Take 12 5 mg by mouth 2 (two) times a day with meals  , Disp: , Rfl:     docusate sodium (COLACE) 100 mg capsule, Take 100 mg by mouth, Disp: , Rfl:     levothyroxine 200 mcg tablet, Take 200 mcg by mouth daily, Disp: , Rfl: 1    methylcellulose (MIRAFIBER) 500 mg tablet, Take 1,000 mg by mouth daily, Disp: , Rfl:     Multiple Vitamin (MULTIVITAMIN) tablet, Take 1 tablet by mouth daily, Disp: , Rfl:     omeprazole (PriLOSEC) 20 mg delayed release capsule, Take 1 capsule by mouth Daily, Disp: , Rfl:     Probiotic Product (PROBIOTIC DAILY PO), Take 1 capsule by mouth daily, Disp: , Rfl: Allergies   Allergen Reactions    Other Itching     tape  tape     Vitals:    08/28/18 1110   BP: 126/82   Pulse: (!) 112   Resp: 16   Temp: 97 8 °F (36 6 °C)       Physical Exam  Constitutional: General appearance: The Patient is well-developed and well-nourished who appears the stated age in no acute distress  Patient is pleasant and talkative  HEENT:  Normocephalic  Sclerae are anicteric  Mucous membranes are moist  Neck is supple without adenopathy  No JVD  Chest: The lungs are clear to auscultation  Cardiac: Heart is regular rate  Abdomen: Abdomen is soft, non-tender, non-distended and without masses  Extremities: There is no clubbing or cyanosis  There is no edema  Symmetric  Neuro: Grossly nonfocal  Gait is normal      Lymphatic: No evidence of cervical adenopathy bilaterally  No evidence of axillary adenopathy bilaterally  Skin: Warm, anicteric  Psych:  Patient is pleasant and talkative  Breasts:        Pathology:      Labs:      Imaging  Us Gallbladder    Result Date: 8/23/2018  Narrative: RIGHT UPPER QUADRANT ULTRASOUND INDICATION:     K80 20: Calculus of gallbladder without cholecystitis without obstruction  Follow-up  COMPARISON:  MRI 1/9/2018  Ultrasound 12/6/2017  TECHNIQUE:   Real-time ultrasound of the right upper quadrant was performed with a curvilinear transducer with both volumetric sweeps and still imaging techniques  FINDINGS: PANCREAS:  Visualized portions of the pancreas are within normal limits  AORTA AND IVC:  Visualized portions are normal for patient age  LIVER: Size:  Within normal range  The liver measures 17 cm in the midclavicular line  Contour:  Surface contour is smooth  Parenchyma:  Echogenicity and echotexture are within normal limits  No evidence of suspicious mass  Limited imaging of the main portal vein shows it to be patent and hepatopetal   BILIARY: The gallbladder is normal in caliber  No wall thickening or pericholecystic fluid  There are multiple calculi, including a 2 1 cm calculus lodged in the neck  No sonographic Cheney sign  No intrahepatic biliary dilatation  CBD measures 7 mm, unchanged since the prior exams  No choledocholithiasis  KIDNEY: Right kidney measures 11 8 cm  Within normal limits  ASCITES:   None  Impression: Cholelithiasis, with no evidence of acute cholecystitis  Unchanged borderline enlarged common bile duct, with no evidence of choledocholithiasis  No intrahepatic biliary dilatation  Workstation performed: [id number]     I reviewed the above laboratory and imaging data  Discussion/Summary:   61-year-old female status post partial cholecystectomy with a gallstone seen in gallbladder remnant/cystic duct  She has stable minimal biliary dilatation  Once again suspect that her pain is related to the stone in her duct becoming intermittently impacted or continue was passage of small stones  I think at this time she should probably have her gallbladder remnant removed  I do not think she would require any significant biliary reconstruction since her bile duct does not appear abnormally dilated  Since her weight has been stable eyes, and she still has the symptoms I think surgery would be appropriate at this time  I will have her set up to see 1 of the general surgeons to discuss this as I do not think that significant biliary reconstruction will be required  If that is the case, I will be available to help  I discussed this with Dr Nora Sheppard  She is agreeable to this  All her questions were answered

## 2018-09-19 ENCOUNTER — OFFICE VISIT (OUTPATIENT)
Dept: SURGERY | Facility: CLINIC | Age: 58
End: 2018-09-19
Payer: COMMERCIAL

## 2018-09-19 VITALS
DIASTOLIC BLOOD PRESSURE: 68 MMHG | HEIGHT: 62 IN | SYSTOLIC BLOOD PRESSURE: 118 MMHG | BODY MASS INDEX: 45.64 KG/M2 | WEIGHT: 248 LBS | TEMPERATURE: 98.4 F

## 2018-09-19 DIAGNOSIS — K81.9 CHOLECYSTITIS: Primary | ICD-10-CM

## 2018-09-19 PROCEDURE — 99204 OFFICE O/P NEW MOD 45 MIN: CPT | Performed by: SURGERY

## 2018-09-19 NOTE — PROGRESS NOTES
Office Visit - General Surgery  Jame Taveras MRN: [id number]  Encounter: [id number]    Assessment and Plan    Problem List Items Addressed This Visit        Digestive    Cholecystitis - Primary     She currently has symptomatic gallbladder disease  Told the best option would be to do a cholecystectomy  I told her I would approach this laparoscopically with higher than average risk of converting to open because of her previous surgical history  I discussed the surgery in detail including risks, benefits, alternatives, with expect postoperatively  I also does told her I may do a cholangiogram most likely for anatomy  She understands and is agreeable to proceed  We will do this at her convenience  Chief Complaint:  Jame Taveras is a 56 y o  female who presents for Post-op (p/o open claude  10-16  Patient has a retained gallstone )    Subjective  61-year-old female who previously had gallbladder symptomatology and had a laparoscopic converted to open gallbladder operation  She then had repeat surgery for drain removal and then a drain placed  Since then she has undergone sleeve gastrectomy in lost 120 lb  She was seen recently because she continues to have right upper quadrant pain often times after eating dairy products  There is no radiation and the pain is sharp in nature  There is no fever, chills no chills, no nausea, no vomiting, no jaundice associated with the symptoms  She has had repeat testing including an MRI and ultrasound which documents a gallbladder with gallstones present    Past Medical History  Past Medical History:   Diagnosis Date    Anemia     CPAP (continuous positive airway pressure) dependence     Hx of bleeding disorder     Kidney stone     Sleep apnea     Wears glasses        Past Surgical History  Past Surgical History:   Procedure Laterality Date    CHOLECYSTECTOMY      partial    CYSTOSCOPY      ESOPHAGOGASTRODUODENOSCOPY N/A 8/21/2017    Procedure: ESOPHAGOGASTRODUODENOSCOPY (EGD); Surgeon: Ethan Gagnon MD;  [facility];  Service: Bariatrics    LITHOTRIPSY      [place] LAP, CLAUDIA RESTRICT PROC, LONGITUDINAL GASTRECTOMY N/A 8/21/2017    Procedure: GASTRECTOMY SLEEVE LAPAROSCOPIC;  Surgeon: Ethan Gagnon MD;  [facility];  Service: Bariatrics    REMOVAL URETERAL STENT Left     URETERAL STENT PLACEMENT Left        Family History  Family History   Problem Relation Age of Onset    Diabetes Mother     Hypertension Mother     Stomach cancer Mother     No Known Problems Father        Medications  Current Outpatient Prescriptions on File Prior to Visit   Medication Sig Dispense Refill    apixaban (ELIQUIS) 5 mg Take 1 tablet by mouth 2 (two) times a day  0    Biotin 1000 MCG tablet Take 1,000 mcg by mouth 3 (three) times a day      calcium citrate-vitamin d (CALCIUM CITRATE CHEWY BITE) 500-500 MG-UNIT CHEW chewable tablet Chew      carvedilol (COREG) 25 mg tablet Take 12 5 mg by mouth 2 (two) times a day with meals        docusate sodium (COLACE) 100 mg capsule Take 100 mg by mouth      levothyroxine 200 mcg tablet Take 200 mcg by mouth daily  1    methylcellulose (MIRAFIBER) 500 mg tablet Take 1,000 mg by mouth daily      Multiple Vitamin (MULTIVITAMIN) tablet Take 1 tablet by mouth daily      omeprazole (PriLOSEC) 20 mg delayed release capsule Take 1 capsule by mouth Daily      Probiotic Product (PROBIOTIC DAILY PO) Take 1 capsule by mouth daily       No current facility-administered medications on file prior to visit  Allergies  Allergies   Allergen Reactions    Other Itching     tape  tape       Review of Systems   Constitutional: Negative for chills and fever  HENT: Negative for trouble swallowing and voice change  Eyes: Negative for pain and visual disturbance  Respiratory: Negative for cough and shortness of breath  Cardiovascular: Negative for chest pain and leg swelling     Gastrointestinal: Positive for abdominal pain  Negative for nausea and vomiting  Right upper quadrant pain sharp, no radiation, no associated symptoms   Endocrine: Negative for cold intolerance, heat intolerance, polydipsia, polyphagia and polyuria  Genitourinary: Negative for difficulty urinating and flank pain  Musculoskeletal: Positive for arthralgias  Negative for gait problem  Skin: Negative for rash and wound  Allergic/Immunologic: Negative for food allergies  Neurological: Negative for seizures, syncope, weakness and headaches  Hematological: Negative for adenopathy  Psychiatric/Behavioral: Negative for confusion  All other systems reviewed and are negative  Objective  Vitals:    09/19/18 1102   BP: 118/68   Temp: 98 4 °F (36 9 °C)       Physical Exam   Constitutional: She is oriented to person, place, and time  No distress  Obese white female   HENT:   Head: Normocephalic and atraumatic  Right Ear: External ear normal    Left Ear: External ear normal    Eyes: Conjunctivae are normal  No scleral icterus  Neck: Neck supple  No tracheal deviation present  No thyromegaly present  Cardiovascular: Normal rate, regular rhythm and normal heart sounds  Exam reveals no friction rub  No murmur heard  Pulmonary/Chest: Effort normal and breath sounds normal  No respiratory distress  She has no wheezes  She has no rales  Abdominal: Soft  She exhibits no mass  There is no tenderness  There is no rebound and no guarding  Right upper quadrant transverse incision well healed  Multiple upper abdominal incisions well healed  Soft nontender no masses appreciated   Musculoskeletal: Normal range of motion  She exhibits no edema  Lymphadenopathy:     She has no cervical adenopathy  Neurological: She is alert and oriented to person, place, and time  Skin: Skin is warm and dry  She is not diaphoretic  Psychiatric: She has a normal mood and affect   Her behavior is normal  Judgment and thought content normal  Nursing note and vitals reviewed

## 2018-09-19 NOTE — ASSESSMENT & PLAN NOTE
She currently has symptomatic gallbladder disease  Told the best option would be to do a cholecystectomy  I told her I would approach this laparoscopically with higher than average risk of converting to open because of her previous surgical history  I discussed the surgery in detail including risks, benefits, alternatives, with expect postoperatively  I also does told her I may do a cholangiogram most likely for anatomy  She understands and is agreeable to proceed  We will do this at her convenience

## 2018-09-19 NOTE — LETTER
September 19, 2018     [address] [address]    Patient: Mallika Johnson   YOB: 1960   Date of Visit: 9/19/2018       Dear Dr Kamar Ricks: Thank you for referring Mallika Johnson to me for evaluation  Below are my notes for this consultation  If you have questions, please do not hesitate to call me  I look forward to following your patient along with you  Sincerely,        Gretchen Winn MD        CC: MD Gretchen Brasher MD  9/19/2018 12:08 PM  Sign at close encounter  Office Visit - General Surgery  Mallika Johnson MRN: [id number]  Encounter: [id number]    Assessment and Plan    Problem List Items Addressed This Visit        Digestive    Cholecystitis - Primary     She currently has symptomatic gallbladder disease  Told the best option would be to do a cholecystectomy  I told her I would approach this laparoscopically with higher than average risk of converting to open because of her previous surgical history  I discussed the surgery in detail including risks, benefits, alternatives, with expect postoperatively  I also does told her I may do a cholangiogram most likely for anatomy  She understands and is agreeable to proceed  We will do this at her convenience  Chief Complaint:  Mallika Johnson is a 56 y o  female who presents for Post-op (p/o open claude  10-16  Patient has a retained gallstone )    Subjective  77-year-old female who previously had gallbladder symptomatology and had a laparoscopic converted to open gallbladder operation  She then had repeat surgery for drain removal and then a drain placed  Since then she has undergone sleeve gastrectomy in lost 120 lb  She was seen recently because she continues to have right upper quadrant pain often times after eating dairy products  There is no radiation and the pain is sharp in nature    There is no fever, chills no chills, no nausea, no vomiting, no jaundice associated with the symptoms  She has had repeat testing including an MRI and ultrasound which documents a gallbladder with gallstones present  Past Medical History  Past Medical History:   Diagnosis Date    Anemia     CPAP (continuous positive airway pressure) dependence     Hx of bleeding disorder     Kidney stone     Sleep apnea     Wears glasses        Past Surgical History  Past Surgical History:   Procedure Laterality Date    CHOLECYSTECTOMY      partial    CYSTOSCOPY      ESOPHAGOGASTRODUODENOSCOPY N/A 8/21/2017    Procedure: ESOPHAGOGASTRODUODENOSCOPY (EGD);   Surgeon: Gurvinder Quiroz MD;  [facility];  Service: Bariatrics    LITHOTRIPSY      [place] LAP, CLAUDIA RESTRICT PROC, LONGITUDINAL GASTRECTOMY N/A 8/21/2017    Procedure: GASTRECTOMY SLEEVE LAPAROSCOPIC;  Surgeon: Gurvinder Quiroz MD;  [facility];  Service: Bariatrics    REMOVAL URETERAL STENT Left     URETERAL STENT PLACEMENT Left        Family History  Family History   Problem Relation Age of Onset    Diabetes Mother     Hypertension Mother     Stomach cancer Mother     No Known Problems Father        Medications  Current Outpatient Prescriptions on File Prior to Visit   Medication Sig Dispense Refill    apixaban (ELIQUIS) 5 mg Take 1 tablet by mouth 2 (two) times a day  0    Biotin 1000 MCG tablet Take 1,000 mcg by mouth 3 (three) times a day      calcium citrate-vitamin d (CALCIUM CITRATE CHEWY BITE) 500-500 MG-UNIT CHEW chewable tablet Chew      carvedilol (COREG) 25 mg tablet Take 12 5 mg by mouth 2 (two) times a day with meals        docusate sodium (COLACE) 100 mg capsule Take 100 mg by mouth      levothyroxine 200 mcg tablet Take 200 mcg by mouth daily  1    methylcellulose (MIRAFIBER) 500 mg tablet Take 1,000 mg by mouth daily      Multiple Vitamin (MULTIVITAMIN) tablet Take 1 tablet by mouth daily      omeprazole (PriLOSEC) 20 mg delayed release capsule Take 1 capsule by mouth Daily      Probiotic Product (PROBIOTIC DAILY PO) Take 1 capsule by mouth daily       No current facility-administered medications on file prior to visit  Allergies  Allergies   Allergen Reactions    Other Itching     tape  tape       Review of Systems   Constitutional: Negative for chills and fever  HENT: Negative for trouble swallowing and voice change  Eyes: Negative for pain and visual disturbance  Respiratory: Negative for cough and shortness of breath  Cardiovascular: Negative for chest pain and leg swelling  Gastrointestinal: Positive for abdominal pain  Negative for nausea and vomiting  Right upper quadrant pain sharp, no radiation, no associated symptoms   Endocrine: Negative for cold intolerance, heat intolerance, polydipsia, polyphagia and polyuria  Genitourinary: Negative for difficulty urinating and flank pain  Musculoskeletal: Positive for arthralgias  Negative for gait problem  Skin: Negative for rash and wound  Allergic/Immunologic: Negative for food allergies  Neurological: Negative for seizures, syncope, weakness and headaches  Hematological: Negative for adenopathy  Psychiatric/Behavioral: Negative for confusion  All other systems reviewed and are negative  Objective  Vitals:    09/19/18 1102   BP: 118/68   Temp: 98 4 °F (36 9 °C)       Physical Exam   Constitutional: She is oriented to person, place, and time  No distress  Obese white female   HENT:   Head: Normocephalic and atraumatic  Right Ear: External ear normal    Left Ear: External ear normal    Eyes: Conjunctivae are normal  No scleral icterus  Neck: Neck supple  No tracheal deviation present  No thyromegaly present  Cardiovascular: Normal rate, regular rhythm and normal heart sounds  Exam reveals no friction rub  No murmur heard  Pulmonary/Chest: Effort normal and breath sounds normal  No respiratory distress  She has no wheezes  She has no rales  Abdominal: Soft  She exhibits no mass  There is no tenderness  There is no rebound and no guarding  Right upper quadrant transverse incision well healed  Multiple upper abdominal incisions well healed  Soft nontender no masses appreciated   Musculoskeletal: Normal range of motion  She exhibits no edema  Lymphadenopathy:     She has no cervical adenopathy  Neurological: She is alert and oriented to person, place, and time  Skin: Skin is warm and dry  She is not diaphoretic  Psychiatric: She has a normal mood and affect  Her behavior is normal  Judgment and thought content normal    Nursing note and vitals reviewed

## 2018-09-21 ENCOUNTER — TRANSCRIBE ORDERS (OUTPATIENT)
Dept: ADMINISTRATIVE | Age: 58
End: 2018-09-21

## 2018-09-21 ENCOUNTER — OFFICE VISIT (OUTPATIENT)
Dept: BARIATRICS | Facility: CLINIC | Age: 58
End: 2018-09-21

## 2018-09-21 ENCOUNTER — OFFICE VISIT (OUTPATIENT)
Dept: LAB | Age: 58
End: 2018-09-21
Payer: COMMERCIAL

## 2018-09-21 ENCOUNTER — APPOINTMENT (OUTPATIENT)
Dept: LAB | Age: 58
End: 2018-09-21
Payer: COMMERCIAL

## 2018-09-21 VITALS — BODY MASS INDEX: 45.45 KG/M2 | HEIGHT: 62 IN | WEIGHT: 247 LBS

## 2018-09-21 DIAGNOSIS — K81.9 CHOLECYSTITIS: ICD-10-CM

## 2018-09-21 DIAGNOSIS — I10 BENIGN ESSENTIAL HYPERTENSION: ICD-10-CM

## 2018-09-21 DIAGNOSIS — E78.5 MILD HYPERLIPIDEMIA: ICD-10-CM

## 2018-09-21 DIAGNOSIS — Z98.84 BARIATRIC SURGERY STATUS: ICD-10-CM

## 2018-09-21 DIAGNOSIS — K91.2 POSTSURGICAL MALABSORPTION: Primary | ICD-10-CM

## 2018-09-21 DIAGNOSIS — K91.2 POSTSURGICAL MALABSORPTION: ICD-10-CM

## 2018-09-21 LAB
25(OH)D3 SERPL-MCNC: 31.4 NG/ML (ref 30–100)
ALBUMIN SERPL BCP-MCNC: 3.6 G/DL (ref 3.5–5)
ALP SERPL-CCNC: 70 U/L (ref 46–116)
ALT SERPL W P-5'-P-CCNC: 19 U/L (ref 12–78)
ANION GAP SERPL CALCULATED.3IONS-SCNC: 4 MMOL/L (ref 4–13)
AST SERPL W P-5'-P-CCNC: 20 U/L (ref 5–45)
ATRIAL RATE: 66 BPM
BASOPHILS # BLD AUTO: 0.04 THOUSANDS/ΜL (ref 0–0.1)
BASOPHILS NFR BLD AUTO: 1 % (ref 0–1)
BILIRUB SERPL-MCNC: 0.76 MG/DL (ref 0.2–1)
BUN SERPL-MCNC: 15 MG/DL (ref 5–25)
CALCIUM SERPL-MCNC: 9.1 MG/DL (ref 8.3–10.1)
CHLORIDE SERPL-SCNC: 101 MMOL/L (ref 100–108)
CHOLEST SERPL-MCNC: 188 MG/DL (ref 50–200)
CO2 SERPL-SCNC: 29 MMOL/L (ref 21–32)
CREAT SERPL-MCNC: 0.56 MG/DL (ref 0.6–1.3)
EOSINOPHIL # BLD AUTO: 0.15 THOUSAND/ΜL (ref 0–0.61)
EOSINOPHIL NFR BLD AUTO: 3 % (ref 0–6)
ERYTHROCYTE [DISTWIDTH] IN BLOOD BY AUTOMATED COUNT: 14.8 % (ref 11.6–15.1)
FERRITIN SERPL-MCNC: 42 NG/ML (ref 8–388)
FOLATE SERPL-MCNC: >20 NG/ML (ref 3.1–17.5)
GFR SERPL CREATININE-BSD FRML MDRD: 105 ML/MIN/1.73SQ M
GLUCOSE P FAST SERPL-MCNC: 87 MG/DL (ref 65–99)
HCT VFR BLD AUTO: 45.9 % (ref 34.8–46.1)
HDLC SERPL-MCNC: 60 MG/DL (ref 40–60)
HGB BLD-MCNC: 14.6 G/DL (ref 11.5–15.4)
IMM GRANULOCYTES # BLD AUTO: 0.01 THOUSAND/UL (ref 0–0.2)
IMM GRANULOCYTES NFR BLD AUTO: 0 % (ref 0–2)
LDLC SERPL CALC-MCNC: 117 MG/DL (ref 0–100)
LYMPHOCYTES # BLD AUTO: 1.71 THOUSANDS/ΜL (ref 0.6–4.47)
LYMPHOCYTES NFR BLD AUTO: 29 % (ref 14–44)
MCH RBC QN AUTO: 27.4 PG (ref 26.8–34.3)
MCHC RBC AUTO-ENTMCNC: 31.8 G/DL (ref 31.4–37.4)
MCV RBC AUTO: 86 FL (ref 82–98)
MONOCYTES # BLD AUTO: 0.4 THOUSAND/ΜL (ref 0.17–1.22)
MONOCYTES NFR BLD AUTO: 7 % (ref 4–12)
NEUTROPHILS # BLD AUTO: 3.52 THOUSANDS/ΜL (ref 1.85–7.62)
NEUTS SEG NFR BLD AUTO: 60 % (ref 43–75)
NONHDLC SERPL-MCNC: 128 MG/DL
NRBC BLD AUTO-RTO: 0 /100 WBCS
PLATELET # BLD AUTO: 202 THOUSANDS/UL (ref 149–390)
PMV BLD AUTO: 10.9 FL (ref 8.9–12.7)
POTASSIUM SERPL-SCNC: 4.4 MMOL/L (ref 3.5–5.3)
PROT SERPL-MCNC: 8.4 G/DL (ref 6.4–8.2)
PTH-INTACT SERPL-MCNC: 52 PG/ML (ref 18.4–80.1)
QRS AXIS: 24 DEGREES
QRSD INTERVAL: 94 MS
QT INTERVAL: 402 MS
QTC INTERVAL: 436 MS
RBC # BLD AUTO: 5.32 MILLION/UL (ref 3.81–5.12)
SODIUM SERPL-SCNC: 134 MMOL/L (ref 136–145)
T WAVE AXIS: 33 DEGREES
TRIGL SERPL-MCNC: 56 MG/DL
VENTRICULAR RATE: 71 BPM
VIT B12 SERPL-MCNC: 1239 PG/ML (ref 100–900)
WBC # BLD AUTO: 5.83 THOUSAND/UL (ref 4.31–10.16)

## 2018-09-21 PROCEDURE — 80061 LIPID PANEL: CPT

## 2018-09-21 PROCEDURE — 84425 ASSAY OF VITAMIN B-1: CPT

## 2018-09-21 PROCEDURE — 93010 ELECTROCARDIOGRAM REPORT: CPT | Performed by: INTERNAL MEDICINE

## 2018-09-21 PROCEDURE — 82746 ASSAY OF FOLIC ACID SERUM: CPT

## 2018-09-21 PROCEDURE — 80053 COMPREHEN METABOLIC PANEL: CPT

## 2018-09-21 PROCEDURE — RECHECK: Performed by: DIETITIAN, REGISTERED

## 2018-09-21 PROCEDURE — 82607 VITAMIN B-12: CPT

## 2018-09-21 PROCEDURE — 93005 ELECTROCARDIOGRAM TRACING: CPT

## 2018-09-21 PROCEDURE — 83970 ASSAY OF PARATHORMONE: CPT

## 2018-09-21 PROCEDURE — 82728 ASSAY OF FERRITIN: CPT

## 2018-09-21 PROCEDURE — 85025 COMPLETE CBC W/AUTO DIFF WBC: CPT

## 2018-09-21 PROCEDURE — 84590 ASSAY OF VITAMIN A: CPT

## 2018-09-21 PROCEDURE — 36415 COLL VENOUS BLD VENIPUNCTURE: CPT

## 2018-09-21 PROCEDURE — 82306 VITAMIN D 25 HYDROXY: CPT

## 2018-09-21 NOTE — PROGRESS NOTES
Bariatric Follow Up Nutrition Note    Type of surgery  Vertical sleeve gastrectomy  Surgery Date: 8/21/2017  13 months  post-op  Surgeon: Dr Olive Hubbard  56 y o   female  Height 5' 2" (1 575 m), weight 112 kg (247 lb)  Body mass index is 45 18 kg/m²  1 2# wt loss from last visit  Weight on Day of Weight Loss Surgery: 292 2lbs  Weight in (lb) to have BMI = 25: 137 6lbs  Pre-Op Excess Wt: 193 2lbs  Post-Op Wt Loss: 83 8#/ 43% EBWL in 13 month(s)    Review of History and Medications   Past Medical History:   Diagnosis Date    Anemia     CPAP (continuous positive airway pressure) dependence     Hx of bleeding disorder     Kidney stone     Sleep apnea     Wears glasses      Past Surgical History:   Procedure Laterality Date    CHOLECYSTECTOMY      partial    CYSTOSCOPY      ESOPHAGOGASTRODUODENOSCOPY N/A 8/21/2017    Procedure: ESOPHAGOGASTRODUODENOSCOPY (EGD);   Surgeon: Chris Arrington MD;  Location: [facility];  Service: Bariatrics    LITHOTRIPSY      [place] LAP, CLAUDIA RESTRICT PROC, LONGITUDINAL GASTRECTOMY N/A 8/21/2017    Procedure: Espiridion Blase;  Surgeon: Chris Arrington MD;  Location[facility];  Service: Bariatrics    REMOVAL URETERAL STENT Left     URETERAL STENT PLACEMENT Left      Social History     Social History    Marital status: Single     Spouse name: N/A    Number of children: N/A    Years of education: N/A     Social History Main Topics    Smoking status: Never Smoker    Smokeless tobacco: Never Used    Alcohol use No    Drug use: No    Sexual activity: Not on file     Other Topics Concern    Not on file     Social History Narrative    No narrative on file       Current Outpatient Prescriptions:     apixaban (ELIQUIS) 5 mg, Take 1 tablet by mouth 2 (two) times a day, Disp: , Rfl: 0    Biotin 1000 MCG tablet, Take 1,000 mcg by mouth 3 (three) times a day, Disp: , Rfl:     calcium citrate-vitamin d (CALCIUM CITRATE CHEWY BITE) 500-500 MG-UNIT CHEW chewable tablet, Chew, Disp: , Rfl:     carvedilol (COREG) 25 mg tablet, Take 12 5 mg by mouth 2 (two) times a day with meals  , Disp: , Rfl:     docusate sodium (COLACE) 100 mg capsule, Take 100 mg by mouth, Disp: , Rfl:     levothyroxine 200 mcg tablet, Take 200 mcg by mouth daily, Disp: , Rfl: 1    methylcellulose (MIRAFIBER) 500 mg tablet, Take 1,000 mg by mouth daily, Disp: , Rfl:     Multiple Vitamin (MULTIVITAMIN) tablet, Take 1 tablet by mouth daily, Disp: , Rfl:     omeprazole (PriLOSEC) 20 mg delayed release capsule, Take 1 capsule by mouth Daily, Disp: , Rfl:     Probiotic Product (PROBIOTIC DAILY PO), Take 1 capsule by mouth daily, Disp: , Rfl:     Food Intake and Lifestyle Assessment   Food Intake Assessment completed via usual diet recall  Breakfast: yogurt with fruit  Snack: none   Lunch: salad with chicken  Snack: none  Dinner: chicken or turkey, vegetables  Snack: protein drink or protein bar  Beverage intake: water  Diet texture/stage: regular  Protein supplement: Optimum protein or Premier Protein drink, Think Thin bars  Estimated protein intake per day: 60g  Estimated fluid intake per day: 64+oz  Meals eaten away from home: 0  Typical meal pattern: 3 meals per day and 1 snacks per day  Eating Behaviors: Appropriate diet advancement, Appropriate portion sizes and Does not drink with meals and waits 30-minutes after meal before resuming drinking    Food allergies or intolerances: none  Cultural or Anabaptism considerations: none    Physical Assessment  Nutrition Related Findings  none    Physical Activity  Types of exercise: Walking, on demand exercise/aerobic videos  Current physical limitations: knee pain    Psychosocial Assessment   Support systems: friend(s)  Socioeconomic factors: none    Nutrition Diagnosis-Continued  Diagnosis: Overweight / Obesity (NC-3 3) and Altered GI function (NC-1 4)  Related to: Physical inactivity and Altered GI function  As Evidenced by: BMI >25 and Expected anthropometric outcomes are not achieved     Interventions and Teaching   Patient educated on post-op nutrition guidelines  Patient educated and handouts provided  Capacity of post-surgery stomach  Adequate hydration  Sugar and fat restriction to decrease "dumping syndrome"  Expected weight loss  Weight loss plateaus/ possibility of weight regain  Exercise  Nutrition considerations after surgery  Protein supplements  Meal planning and preparation  Appropriate carbohydrate, protein, and fat intake, and food/fluid choices to maximize safe weight loss, nutrient intake, and tolerance   Dietary and lifestyle changes  Techniques for self monitoring and keeping daily food journal  Potential for food intolerance after surgery, and ways to deal with them including: lactose intolerance, nausea, reflux, vomiting, diarrhea, food intolerance, appetite changes, gas    Education provided to: patient    Barriers to learning: Desire/Motivation    Readiness to change: action and monitoring    Comprehension: verbalizes understanding     Expected Compliance: good    Goals  Food journal, Exercise 30 minutes 5 times per week and Eat 3 meals per day     Met with pt for post-op follow-up  Pt is 13 months s/p sleeve  1 2# wt loss since last month  Total 83 8# wt loss (43% EWL)  Pt reports she has been preparing more healthy food options for when she travels and has begun doing On Demand workout videos at home, and has a step competition going with a friend of hers  Pt works from home and still reports she will go to the kitchen and snack when bored  Discussed food journaling and measuring with pt  Pt will food journal to 1200 calories on Myfitness pal and measure her food portions  Pt will do her 30 minute On Demand exercise videos at least 3 days per week  Also recommended The Success Habits of Weight Loss Surgery Patients book for pt to read   RD+SW follow-up scheduled for Friday, 11/16/18 at 9:00am      Time Spent:   30 Minutes

## 2018-09-29 LAB
LABORATORY COMMENT REPORT: NORMAL
LABORATORY COMMENT REPORT: NORMAL
VIT A SERPL-MCNC: 29.2 UG/DL (ref 33.1–100)
VIT B1 BLD-SCNC: 112.2 NMOL/L (ref 66.5–200)

## 2018-10-04 NOTE — PRE-PROCEDURE INSTRUCTIONS
Pre-Surgery Instructions:   Medication Instructions    apixaban (ELIQUIS) 5 mg Patient was instructed by Physician and understands   Biotin 1000 MCG tablet Instructed patient per Anesthesia Guidelines   calcium citrate-vitamin d (CALCIUM CITRATE CHEWY BITE) 500-500 MG-UNIT CHEW chewable tablet Instructed patient per Anesthesia Guidelines   carvedilol (COREG) 25 mg tablet Instructed patient per Anesthesia Guidelines   docusate sodium (COLACE) 100 mg capsule Instructed patient per Anesthesia Guidelines   levothyroxine 200 mcg tablet Instructed patient per Anesthesia Guidelines   methylcellulose (MIRAFIBER) 500 mg tablet Instructed patient per Anesthesia Guidelines   Multiple Vitamin (MULTIVITAMIN) tablet Instructed patient per Anesthesia Guidelines   omeprazole (PriLOSEC) 20 mg delayed release capsule Instructed patient per Anesthesia Guidelines   Probiotic Product (PROBIOTIC DAILY PO) Instructed patient per Anesthesia Guidelines  Spoke to pt  Medication list reviewed & instructed  As of 10  7  18 pt to stop probiotic, MV, mirafiber, calcium, biotin (hx gastric bypass with caution to stopping vitamins per pt)  Instructed on tylenol only  Per CC, eliquis to be held 48 hour prior  Pt aware  Am DOS pt to take levothyroxine, prilosec, coreg with 1-2 sips of water  Showering instructions given by office, reviewed at time of call  All instructions verbally understood by patient  No further questions  Callback number given

## 2018-10-10 ENCOUNTER — ANESTHESIA EVENT (OUTPATIENT)
Dept: PERIOP | Facility: HOSPITAL | Age: 58
End: 2018-10-10
Payer: COMMERCIAL

## 2018-10-11 ENCOUNTER — ANESTHESIA (OUTPATIENT)
Dept: PERIOP | Facility: HOSPITAL | Age: 58
End: 2018-10-11
Payer: COMMERCIAL

## 2018-10-11 ENCOUNTER — HOSPITAL ENCOUNTER (OUTPATIENT)
Facility: HOSPITAL | Age: 58
Setting detail: OUTPATIENT SURGERY
Discharge: HOME | End: 2018-10-11
Attending: SURGERY | Admitting: SURGERY
Payer: COMMERCIAL

## 2018-10-11 ENCOUNTER — HOSPITAL ENCOUNTER (OUTPATIENT)
Dept: RADIOLOGY | Facility: HOSPITAL | Age: 58
Setting detail: OUTPATIENT SURGERY
Discharge: HOME | End: 2018-10-11
Payer: COMMERCIAL

## 2018-10-11 VITALS
WEIGHT: 247 LBS | OXYGEN SATURATION: 97 % | HEART RATE: 92 BPM | TEMPERATURE: 99.8 F | HEIGHT: 62 IN | SYSTOLIC BLOOD PRESSURE: 127 MMHG | BODY MASS INDEX: 45.45 KG/M2 | RESPIRATION RATE: 16 BRPM | DIASTOLIC BLOOD PRESSURE: 81 MMHG

## 2018-10-11 DIAGNOSIS — K81.9 CHOLECYSTITIS: ICD-10-CM

## 2018-10-11 PROCEDURE — 88304 TISSUE EXAM BY PATHOLOGIST: CPT | Performed by: PATHOLOGY

## 2018-10-11 PROCEDURE — 47562 LAPAROSCOPIC CHOLECYSTECTOMY: CPT | Performed by: SURGERY

## 2018-10-11 RX ORDER — LIDOCAINE HYDROCHLORIDE 10 MG/ML
0.5 INJECTION, SOLUTION INFILTRATION; PERINEURAL ONCE AS NEEDED
Status: DISCONTINUED | OUTPATIENT
Start: 2018-10-11 | End: 2018-10-11 | Stop reason: HOSPADM

## 2018-10-11 RX ORDER — FENTANYL CITRATE/PF 50 MCG/ML
25 SYRINGE (ML) INJECTION
Status: DISCONTINUED | OUTPATIENT
Start: 2018-10-11 | End: 2018-10-11 | Stop reason: HOSPADM

## 2018-10-11 RX ORDER — PROMETHAZINE HYDROCHLORIDE 25 MG/ML
12.5 INJECTION, SOLUTION INTRAMUSCULAR; INTRAVENOUS ONCE AS NEEDED
Status: CANCELLED | OUTPATIENT
Start: 2018-10-11

## 2018-10-11 RX ORDER — OXYCODONE HYDROCHLORIDE AND ACETAMINOPHEN 5; 325 MG/1; MG/1
2 TABLET ORAL EVERY 4 HOURS PRN
Status: DISCONTINUED | OUTPATIENT
Start: 2018-10-11 | End: 2018-10-11 | Stop reason: HOSPADM

## 2018-10-11 RX ORDER — GLYCOPYRROLATE 0.2 MG/ML
INJECTION INTRAMUSCULAR; INTRAVENOUS AS NEEDED
Status: DISCONTINUED | OUTPATIENT
Start: 2018-10-11 | End: 2018-10-11 | Stop reason: SURG

## 2018-10-11 RX ORDER — ALBUTEROL SULFATE 2.5 MG/3ML
2.5 SOLUTION RESPIRATORY (INHALATION) ONCE AS NEEDED
Status: DISCONTINUED | OUTPATIENT
Start: 2018-10-11 | End: 2018-10-11 | Stop reason: HOSPADM

## 2018-10-11 RX ORDER — ONDANSETRON 2 MG/ML
4 INJECTION INTRAMUSCULAR; INTRAVENOUS ONCE AS NEEDED
Status: CANCELLED | OUTPATIENT
Start: 2018-10-11

## 2018-10-11 RX ORDER — HYDROMORPHONE HCL/PF 1 MG/ML
0.5 SYRINGE (ML) INJECTION
Status: DISCONTINUED | OUTPATIENT
Start: 2018-10-11 | End: 2018-10-11 | Stop reason: HOSPADM

## 2018-10-11 RX ORDER — OXYCODONE HYDROCHLORIDE AND ACETAMINOPHEN 5; 325 MG/1; MG/1
1 TABLET ORAL EVERY 4 HOURS PRN
Status: DISCONTINUED | OUTPATIENT
Start: 2018-10-11 | End: 2018-10-11 | Stop reason: HOSPADM

## 2018-10-11 RX ORDER — HYDROMORPHONE HCL/PF 1 MG/ML
0.2 SYRINGE (ML) INJECTION
Status: DISCONTINUED | OUTPATIENT
Start: 2018-10-11 | End: 2018-10-11 | Stop reason: HOSPADM

## 2018-10-11 RX ORDER — HYDROMORPHONE HCL/PF 1 MG/ML
0.5 SYRINGE (ML) INJECTION EVERY 4 HOURS PRN
Status: DISCONTINUED | OUTPATIENT
Start: 2018-10-11 | End: 2018-10-11 | Stop reason: HOSPADM

## 2018-10-11 RX ORDER — PROPOFOL 10 MG/ML
INJECTION, EMULSION INTRAVENOUS AS NEEDED
Status: DISCONTINUED | OUTPATIENT
Start: 2018-10-11 | End: 2018-10-11 | Stop reason: SURG

## 2018-10-11 RX ORDER — KETAMINE HYDROCHLORIDE 50 MG/ML
INJECTION, SOLUTION, CONCENTRATE INTRAMUSCULAR; INTRAVENOUS AS NEEDED
Status: DISCONTINUED | OUTPATIENT
Start: 2018-10-11 | End: 2018-10-11 | Stop reason: SURG

## 2018-10-11 RX ORDER — ONDANSETRON 2 MG/ML
4 INJECTION INTRAMUSCULAR; INTRAVENOUS ONCE AS NEEDED
Status: COMPLETED | OUTPATIENT
Start: 2018-10-11 | End: 2018-10-11

## 2018-10-11 RX ORDER — SODIUM CHLORIDE, SODIUM LACTATE, POTASSIUM CHLORIDE, CALCIUM CHLORIDE 600; 310; 30; 20 MG/100ML; MG/100ML; MG/100ML; MG/100ML
125 INJECTION, SOLUTION INTRAVENOUS CONTINUOUS
Status: CANCELLED | OUTPATIENT
Start: 2018-10-11

## 2018-10-11 RX ORDER — OXYCODONE HYDROCHLORIDE 5 MG/1
5 TABLET ORAL EVERY 4 HOURS PRN
Qty: 20 TABLET | Refills: 0 | Status: SHIPPED | OUTPATIENT
Start: 2018-10-11 | End: 2018-10-21

## 2018-10-11 RX ORDER — ALBUTEROL SULFATE 2.5 MG/3ML
2.5 SOLUTION RESPIRATORY (INHALATION) ONCE AS NEEDED
Status: CANCELLED | OUTPATIENT
Start: 2018-10-11

## 2018-10-11 RX ORDER — ROCURONIUM BROMIDE 10 MG/ML
INJECTION, SOLUTION INTRAVENOUS AS NEEDED
Status: DISCONTINUED | OUTPATIENT
Start: 2018-10-11 | End: 2018-10-11 | Stop reason: SURG

## 2018-10-11 RX ORDER — HYDROMORPHONE HCL/PF 1 MG/ML
0.5 SYRINGE (ML) INJECTION
Status: CANCELLED | OUTPATIENT
Start: 2018-10-11

## 2018-10-11 RX ORDER — METOPROLOL TARTRATE 5 MG/5ML
INJECTION INTRAVENOUS AS NEEDED
Status: DISCONTINUED | OUTPATIENT
Start: 2018-10-11 | End: 2018-10-11 | Stop reason: SURG

## 2018-10-11 RX ORDER — ONDANSETRON 2 MG/ML
4 INJECTION INTRAMUSCULAR; INTRAVENOUS EVERY 4 HOURS PRN
Status: DISCONTINUED | OUTPATIENT
Start: 2018-10-11 | End: 2018-10-11 | Stop reason: HOSPADM

## 2018-10-11 RX ORDER — MIDAZOLAM HYDROCHLORIDE 1 MG/ML
INJECTION INTRAMUSCULAR; INTRAVENOUS AS NEEDED
Status: DISCONTINUED | OUTPATIENT
Start: 2018-10-11 | End: 2018-10-11 | Stop reason: SURG

## 2018-10-11 RX ORDER — METOCLOPRAMIDE HYDROCHLORIDE 5 MG/ML
10 INJECTION INTRAMUSCULAR; INTRAVENOUS ONCE AS NEEDED
Status: COMPLETED | OUTPATIENT
Start: 2018-10-11 | End: 2018-10-11

## 2018-10-11 RX ORDER — SODIUM CHLORIDE, SODIUM LACTATE, POTASSIUM CHLORIDE, CALCIUM CHLORIDE 600; 310; 30; 20 MG/100ML; MG/100ML; MG/100ML; MG/100ML
125 INJECTION, SOLUTION INTRAVENOUS CONTINUOUS
Status: DISCONTINUED | OUTPATIENT
Start: 2018-10-11 | End: 2018-10-11 | Stop reason: HOSPADM

## 2018-10-11 RX ORDER — HYDRALAZINE HYDROCHLORIDE 20 MG/ML
5 INJECTION INTRAMUSCULAR; INTRAVENOUS
Status: DISCONTINUED | OUTPATIENT
Start: 2018-10-11 | End: 2018-10-11 | Stop reason: HOSPADM

## 2018-10-11 RX ORDER — ALBUMIN, HUMAN INJ 5% 5 %
SOLUTION INTRAVENOUS CONTINUOUS PRN
Status: DISCONTINUED | OUTPATIENT
Start: 2018-10-11 | End: 2018-10-11 | Stop reason: SURG

## 2018-10-11 RX ORDER — PROMETHAZINE HYDROCHLORIDE 25 MG/ML
12.5 INJECTION, SOLUTION INTRAMUSCULAR; INTRAVENOUS ONCE AS NEEDED
Status: DISCONTINUED | OUTPATIENT
Start: 2018-10-11 | End: 2018-10-11 | Stop reason: HOSPADM

## 2018-10-11 RX ORDER — HYDROMORPHONE HCL/PF 1 MG/ML
0.2 SYRINGE (ML) INJECTION
Status: CANCELLED | OUTPATIENT
Start: 2018-10-11

## 2018-10-11 RX ORDER — METOCLOPRAMIDE HYDROCHLORIDE 5 MG/ML
10 INJECTION INTRAMUSCULAR; INTRAVENOUS ONCE AS NEEDED
Status: CANCELLED | OUTPATIENT
Start: 2018-10-11

## 2018-10-11 RX ORDER — FENTANYL CITRATE 50 UG/ML
INJECTION, SOLUTION INTRAMUSCULAR; INTRAVENOUS AS NEEDED
Status: DISCONTINUED | OUTPATIENT
Start: 2018-10-11 | End: 2018-10-11 | Stop reason: SURG

## 2018-10-11 RX ORDER — BUPIVACAINE HYDROCHLORIDE AND EPINEPHRINE 5; 5 MG/ML; UG/ML
INJECTION, SOLUTION PERINEURAL AS NEEDED
Status: DISCONTINUED | OUTPATIENT
Start: 2018-10-11 | End: 2018-10-11 | Stop reason: HOSPADM

## 2018-10-11 RX ORDER — LIDOCAINE HYDROCHLORIDE 10 MG/ML
INJECTION, SOLUTION INFILTRATION; PERINEURAL AS NEEDED
Status: DISCONTINUED | OUTPATIENT
Start: 2018-10-11 | End: 2018-10-11 | Stop reason: SURG

## 2018-10-11 RX ORDER — HYDRALAZINE HYDROCHLORIDE 20 MG/ML
5 INJECTION INTRAMUSCULAR; INTRAVENOUS
Status: CANCELLED | OUTPATIENT
Start: 2018-10-11

## 2018-10-11 RX ORDER — ONDANSETRON 2 MG/ML
INJECTION INTRAMUSCULAR; INTRAVENOUS AS NEEDED
Status: DISCONTINUED | OUTPATIENT
Start: 2018-10-11 | End: 2018-10-11 | Stop reason: SURG

## 2018-10-11 RX ADMIN — METOCLOPRAMIDE 10 MG: 5 INJECTION, SOLUTION INTRAMUSCULAR; INTRAVENOUS at 17:35

## 2018-10-11 RX ADMIN — SODIUM CHLORIDE, SODIUM LACTATE, POTASSIUM CHLORIDE, AND CALCIUM CHLORIDE: .6; .31; .03; .02 INJECTION, SOLUTION INTRAVENOUS at 14:00

## 2018-10-11 RX ADMIN — GLYCOPYRROLATE 0.6 MG: 0.2 INJECTION, SOLUTION INTRAMUSCULAR; INTRAVENOUS at 16:30

## 2018-10-11 RX ADMIN — ALBUMIN (HUMAN): 12.5 SOLUTION INTRAVENOUS at 15:31

## 2018-10-11 RX ADMIN — METOPROLOL TARTRATE 2.5 MG: 1 INJECTION, SOLUTION INTRAVENOUS at 16:15

## 2018-10-11 RX ADMIN — FENTANYL CITRATE 25 MCG: 50 INJECTION, SOLUTION INTRAMUSCULAR; INTRAVENOUS at 17:31

## 2018-10-11 RX ADMIN — ONDANSETRON 4 MG: 2 INJECTION INTRAMUSCULAR; INTRAVENOUS at 16:25

## 2018-10-11 RX ADMIN — ONDANSETRON 4 MG: 2 INJECTION INTRAMUSCULAR; INTRAVENOUS at 17:31

## 2018-10-11 RX ADMIN — MIDAZOLAM 2 MG: 1 INJECTION INTRAMUSCULAR; INTRAVENOUS at 14:30

## 2018-10-11 RX ADMIN — NEOSTIGMINE METHYLSULFATE 3 MG: 1 INJECTION, SOLUTION INTRAMUSCULAR; INTRAVENOUS; SUBCUTANEOUS at 16:30

## 2018-10-11 RX ADMIN — PROPOFOL 150 MG: 10 INJECTION, EMULSION INTRAVENOUS at 14:41

## 2018-10-11 RX ADMIN — ROCURONIUM BROMIDE 50 MG: 10 INJECTION INTRAVENOUS at 14:42

## 2018-10-11 RX ADMIN — FENTANYL CITRATE 25 MCG: 50 INJECTION, SOLUTION INTRAMUSCULAR; INTRAVENOUS at 17:36

## 2018-10-11 RX ADMIN — DEXAMETHASONE SODIUM PHOSPHATE 10 MG: 10 INJECTION INTRAMUSCULAR; INTRAVENOUS at 14:50

## 2018-10-11 RX ADMIN — OXYCODONE HYDROCHLORIDE AND ACETAMINOPHEN 1 TABLET: 5; 325 TABLET ORAL at 18:36

## 2018-10-11 RX ADMIN — KETAMINE HYDROCHLORIDE 50 MG: 50 INJECTION, SOLUTION INTRAMUSCULAR; INTRAVENOUS at 14:41

## 2018-10-11 RX ADMIN — FENTANYL CITRATE 100 MCG: 50 INJECTION, SOLUTION INTRAMUSCULAR; INTRAVENOUS at 14:41

## 2018-10-11 RX ADMIN — SODIUM CHLORIDE, SODIUM LACTATE, POTASSIUM CHLORIDE, AND CALCIUM CHLORIDE: .6; .31; .03; .02 INJECTION, SOLUTION INTRAVENOUS at 15:24

## 2018-10-11 RX ADMIN — METOPROLOL TARTRATE 2.5 MG: 1 INJECTION, SOLUTION INTRAVENOUS at 16:06

## 2018-10-11 RX ADMIN — LIDOCAINE HYDROCHLORIDE 100 MG: 10 INJECTION, SOLUTION INFILTRATION; PERINEURAL at 14:40

## 2018-10-11 RX ADMIN — CEFAZOLIN SODIUM 3000 MG: 2 SOLUTION INTRAVENOUS at 14:45

## 2018-10-11 NOTE — OP NOTE
OPERATIVE REPORT  PATIENT NAME: Abdirizak Acevedo    :  1960  MRN: [id number]  Pt Location: [facility] OR ROOM 05    SURGERY DATE: 10/11/2018    Surgeon(s) and Role:     * Nithya Baker MD - Primary     [facility] - Assisting    Preop Diagnosis:  Cholecystitis [K81 9]    Post-Op Diagnosis Codes:     * Cholecystitis [K81 9]    Procedure(s) (LRB):  CHOLECYSTECTOMY LAPAROSCOPIC (N/A)    Specimen(s):  ID Type Source Tests Collected by Time Destination   1 :  Tissue Gallbladder TISSUE EXAM Nithya Baker MD 10/11/2018 1552        Estimated Blood Loss:   Minimal    Drains:       Anesthesia Type:   General    Operative Indications:  Cholecystitis [K81 9]      Operative Findings:  Multiple dense adhesions to RIght upper abdominal wall and liver bed      Complications:   None    Procedure and Technique:    The patient was seen again in the Holding Room  The risks, benefits, complications, treatment options, and expected outcomes were discussed with the patient  The possibilities of reaction to medication, pulmonary aspiration, perforation of viscus, bleeding, recurrent infection, finding a normal gallbladder, the need for additional procedures, failure to diagnose a condition, the possible need to convert to an open procedure, and creating a complication requiring transfusion or operation were discussed with the patient  The patient and/or family concurred with the proposed plan, giving informed consent  The site of surgery properly noted/marked  The patient was taken to Operating Room, identified as Abdirizak Acevedo and the procedure verified as Laparoscopic Cholecystectomy with possible  Intraoperative Cholangiograms  A Time Out was held and the above information confirmed  Prior to the induction of general anesthesia, antibiotic prophylaxis was administered  General endotracheal anesthesia was then administered and tolerated well  After the induction, the abdomen was prepped in the usual sterile fashion   The patient was positioned in the supine position  Local anesthetic agent was injected into the skin near the umbilicus and an incision made  The midline fascia was incised and the Rolly technique was used to introduce a 11 mm port under direct vision Pneumoperitoneum was then created with CO2 and tolerated well without any adverse changes in the patient's vital signs  An additional trocar was placed in the patients right mid abdomen  Dense adhesions to the right anterior abdominal wall were taken with blunt and electrocuatery dissection  Additional 5mm ports were then placed subxiphoid and in the right upper quadrant  All skin incisions were infiltrated with a local anesthetic agent before making the incision and placing the trocars  After dissecting multiple dense adhesions,The gallbladder was identified, the fundus grasped and retracted cephalad  Adhesions were lysed bluntly and with the electrocautery where indicated, taking care not to injure any adjacent organs or viscus  The infundibulum was grasped and retracted laterally, exposing the peritoneum overlying the triangle of Calot  This was then divided and exposed in a blunt fashion  The cystic duct was clearly identified and bluntly dissected circumferentially  The junctions of the gallbladder, cystic duct and common bile duct were clearly identified prior to the division of any linear structure and photo documented  The cystic duct was then doubly ligated with surgical clips  on the patient side and singly clipped on the gallbladder side and divided  The cystic artery was identified, dissected free, ligated with clips and divided as well  Some bleeding was noted on the patient side of the cystic artery and therefore additional dissection and clips were placed    The gallbladder was dissected from the liver bed in retrograde fashion with the electrocautery  The gallbladder was removed  The liver bed was irrigated and inspected   Hemostasis was achieved with the electrocautery  Copious irrigation was utilized and was repeatedly aspirated until clear all particulate matter  Pneumoperitoneum was completely reduced after viewing removal of the trocars under direct vision  The wound was thoroughly irrigated and the fascia was then closed with a figure of eight suture; the skin was then closed with 4-0 monocryl and a sterile dressing was applied  Instrument, sponge, and needle counts were correct at closure and at the conclusion of the case       RF wanding and counts were complete at the end of hte case    Dr Sandoval Basurto was present for the entire case       Patient Disposition:  PACU     SIGNATURE: Ashlee Juarez  DATE: October 11, 2018  TIME: 4:58 PM

## 2018-10-11 NOTE — INTERVAL H&P NOTE
H&P reviewed  After examining the patient I find no changes in the patients condition since the H&P had been written    Plan: laparoscopic cholecystectomy

## 2018-10-11 NOTE — ANESTHESIA PREPROCEDURE EVALUATION
Review of Systems/Medical History  Patient summary reviewed  Chart reviewed  No history of anesthetic complications     Cardiovascular  EKG reviewed, Exercise tolerance (METS): >4,  Hypertension , Dysrhythmias , atrial fibrillation, CHF compensated CHF,    Pulmonary  No sleep apnea (Resolved after weight loss) ,        GI/Hepatic      Comment: Per HPI: cholelithiasis     Kidney stones,        Endo/Other  History of thyroid disease , hypothyroidism,   Obesity  morbid obesity   GYN  Negative gynecology ROS          Hematology  Anemia ,     Musculoskeletal  Negative musculoskeletal ROS        Neurology  Negative neurology ROS      Psychology   Negative psychology ROS              Physical Exam    Airway    Mallampati score: II  TM Distance: >3 FB  Neck ROM: full     Dental   Comment: Chipped teeth,     Cardiovascular  Rhythm: irregular, Rate: normal,     Pulmonary  Pulmonary exam normal Breath sounds clear to auscultation,     Other Findings        Anesthesia Plan  ASA Score- 3     Anesthesia Type- general with ASA Monitors  Additional Monitors:   Airway Plan: ETT  Plan Factors-    Induction- intravenous  Postoperative Plan-     Informed Consent- Anesthetic plan and risks discussed with patient  I personally reviewed this patient with the CRNA  Discussed and agreed on the Anesthesia Plan with the CRNA  David Carroll

## 2018-10-11 NOTE — H&P (VIEW-ONLY)
Office Visit - General Surgery  Luca Vazquez MRN: [id number]  Encounter: [id number]    Assessment and Plan    Problem List Items Addressed This Visit        Digestive    Cholecystitis - Primary     She currently has symptomatic gallbladder disease  Told the best option would be to do a cholecystectomy  I told her I would approach this laparoscopically with higher than average risk of converting to open because of her previous surgical history  I discussed the surgery in detail including risks, benefits, alternatives, with expect postoperatively  I also does told her I may do a cholangiogram most likely for anatomy  She understands and is agreeable to proceed  We will do this at her convenience  Chief Complaint:  Luca Vazquez is a 56 y o  female who presents for Post-op (p/o open claude  10-16  Patient has a retained gallstone )    Subjective  19-year-old female who previously had gallbladder symptomatology and had a laparoscopic converted to open gallbladder operation  She then had repeat surgery for drain removal and then a drain placed  Since then she has undergone sleeve gastrectomy in lost 120 lb  She was seen recently because she continues to have right upper quadrant pain often times after eating dairy products  There is no radiation and the pain is sharp in nature  There is no fever, chills no chills, no nausea, no vomiting, no jaundice associated with the symptoms  She has had repeat testing including an MRI and ultrasound which documents a gallbladder with gallstones present    Past Medical History  Past Medical History:   Diagnosis Date    Anemia     CPAP (continuous positive airway pressure) dependence     Hx of bleeding disorder     Kidney stone     Sleep apnea     Wears glasses        Past Surgical History  Past Surgical History:   Procedure Laterality Date    CHOLECYSTECTOMY      partial    CYSTOSCOPY      ESOPHAGOGASTRODUODENOSCOPY N/A 8/21/2017    Procedure: ESOPHAGOGASTRODUODENOSCOPY (EGD); Surgeon: Maria Esther Howe MD;  [facility];  Service: Bariatrics    LITHOTRIPSY      [place] LAP, CLAUDIA RESTRICT PROC, LONGITUDINAL GASTRECTOMY N/A 8/21/2017    Procedure: GASTRECTOMY SLEEVE LAPAROSCOPIC;  Surgeon: Maria Esther Howe MD;  [facility];  Service: Bariatrics    REMOVAL URETERAL STENT Left     URETERAL STENT PLACEMENT Left        Family History  Family History   Problem Relation Age of Onset    Diabetes Mother     Hypertension Mother     Stomach cancer Mother     No Known Problems Father        Medications  Current Outpatient Prescriptions on File Prior to Visit   Medication Sig Dispense Refill    apixaban (ELIQUIS) 5 mg Take 1 tablet by mouth 2 (two) times a day  0    Biotin 1000 MCG tablet Take 1,000 mcg by mouth 3 (three) times a day      calcium citrate-vitamin d (CALCIUM CITRATE CHEWY BITE) 500-500 MG-UNIT CHEW chewable tablet Chew      carvedilol (COREG) 25 mg tablet Take 12 5 mg by mouth 2 (two) times a day with meals        docusate sodium (COLACE) 100 mg capsule Take 100 mg by mouth      levothyroxine 200 mcg tablet Take 200 mcg by mouth daily  1    methylcellulose (MIRAFIBER) 500 mg tablet Take 1,000 mg by mouth daily      Multiple Vitamin (MULTIVITAMIN) tablet Take 1 tablet by mouth daily      omeprazole (PriLOSEC) 20 mg delayed release capsule Take 1 capsule by mouth Daily      Probiotic Product (PROBIOTIC DAILY PO) Take 1 capsule by mouth daily       No current facility-administered medications on file prior to visit  Allergies  Allergies   Allergen Reactions    Other Itching     tape  tape       Review of Systems   Constitutional: Negative for chills and fever  HENT: Negative for trouble swallowing and voice change  Eyes: Negative for pain and visual disturbance  Respiratory: Negative for cough and shortness of breath  Cardiovascular: Negative for chest pain and leg swelling     Gastrointestinal: Positive for abdominal pain  Negative for nausea and vomiting  Right upper quadrant pain sharp, no radiation, no associated symptoms   Endocrine: Negative for cold intolerance, heat intolerance, polydipsia, polyphagia and polyuria  Genitourinary: Negative for difficulty urinating and flank pain  Musculoskeletal: Positive for arthralgias  Negative for gait problem  Skin: Negative for rash and wound  Allergic/Immunologic: Negative for food allergies  Neurological: Negative for seizures, syncope, weakness and headaches  Hematological: Negative for adenopathy  Psychiatric/Behavioral: Negative for confusion  All other systems reviewed and are negative  Objective  Vitals:    09/19/18 1102   BP: 118/68   Temp: 98 4 °F (36 9 °C)       Physical Exam   Constitutional: She is oriented to person, place, and time  No distress  Obese white female   HENT:   Head: Normocephalic and atraumatic  Right Ear: External ear normal    Left Ear: External ear normal    Eyes: Conjunctivae are normal  No scleral icterus  Neck: Neck supple  No tracheal deviation present  No thyromegaly present  Cardiovascular: Normal rate, regular rhythm and normal heart sounds  Exam reveals no friction rub  No murmur heard  Pulmonary/Chest: Effort normal and breath sounds normal  No respiratory distress  She has no wheezes  She has no rales  Abdominal: Soft  She exhibits no mass  There is no tenderness  There is no rebound and no guarding  Right upper quadrant transverse incision well healed  Multiple upper abdominal incisions well healed  Soft nontender no masses appreciated   Musculoskeletal: Normal range of motion  She exhibits no edema  Lymphadenopathy:     She has no cervical adenopathy  Neurological: She is alert and oriented to person, place, and time  Skin: Skin is warm and dry  She is not diaphoretic  Psychiatric: She has a normal mood and affect   Her behavior is normal  Judgment and thought content normal  Nursing note and vitals reviewed

## 2018-10-11 NOTE — DISCHARGE INSTRUCTIONS
Please follow-up as scheduled  Activity:  - No lifting greater than 20 pounds or strenuous physical activity or exercise for 2 weeks  - Walking and normal light activities are encouraged  - Normal daily activities including climbing steps are okay  - No driving until no longer using pain medications  Diet:    - You may resume your normal diet  Wound Care:  - May shower daily  No tub baths or swimming until cleared by your surgeon   - Wash incision gently with soap and water and pat dry  - Do not apply any creams or ointments unless instructed to do so by your surgeon   - Nas Pepejorge may apply ice as needed (no longer than 20 minutes at a time) for the first 48 hours  - Bruising is not unusual and will go away with a little time  You may apply a warm, moist compress that may help the bruising resolve quicker  - You may remove the dressings the day after surgery (unless otherwise instructed)  Leave any skin tapes (steri-strips) on the incision(s) in place until they fall off on their own  Any new dressings are optional     Medications:    - You may resume all of your regular medications, including blood thinners and aspirin, after going home unless otherwise instructed  Please refer to your discharge medication list for further details  - Please take the pain medications as directed  - You are encouraged to use non-narcotic pain medications first and whenever possible  Reserve the use of narcotic pain medication for moderate to severe pain not controlled by non-narcotic medications   - No driving while taking narcotic pain medications  - You may become constipated, especially if taking pain medications  You may take any over the counter stool softeners or laxatives as needed  Examples: Milk of Magnesia, Colace, Senna      Additional Instructions:  - If you have any questions or concerns after discharge please call the office   - Call office or return to ER if fever greater than 101, chills, persistent nausea/vomiting, worsening/uncontrollable pain, and/or increasing redness or purulent/foul smelling drainage from incision(s)

## 2018-10-11 NOTE — ANESTHESIA POSTPROCEDURE EVALUATION
Post-Op Assessment Note      CV Status:  Stable    Mental Status:  Alert and awake    Hydration Status:  Euvolemic    PONV Controlled:  Controlled    Airway Patency:  Patent    Post Op Vitals Reviewed: Yes          Staff: CRNA, Anesthesiologist           /50   Temp   96 7   Pulse  81   Resp   14   SpO2   99     Patient denies pain, denies nausea, resting comfortably in PACU

## 2018-10-16 ENCOUNTER — OFFICE VISIT (OUTPATIENT)
Dept: BARIATRICS | Facility: CLINIC | Age: 58
End: 2018-10-16
Payer: COMMERCIAL

## 2018-10-16 VITALS
DIASTOLIC BLOOD PRESSURE: 64 MMHG | RESPIRATION RATE: 18 BRPM | BODY MASS INDEX: 45.54 KG/M2 | HEART RATE: 80 BPM | HEIGHT: 62 IN | WEIGHT: 247.5 LBS | TEMPERATURE: 98.2 F | SYSTOLIC BLOOD PRESSURE: 122 MMHG

## 2018-10-16 DIAGNOSIS — K91.2 POSTSURGICAL MALABSORPTION: ICD-10-CM

## 2018-10-16 DIAGNOSIS — R12 HEART BURN: ICD-10-CM

## 2018-10-16 DIAGNOSIS — I10 ESSENTIAL HYPERTENSION: ICD-10-CM

## 2018-10-16 DIAGNOSIS — R79.89 LOW SERUM VITAMIN A: ICD-10-CM

## 2018-10-16 DIAGNOSIS — Z99.89 CPAP (CONTINUOUS POSITIVE AIRWAY PRESSURE) DEPENDENCE: ICD-10-CM

## 2018-10-16 DIAGNOSIS — E78.5 MILD HYPERLIPIDEMIA: ICD-10-CM

## 2018-10-16 DIAGNOSIS — E66.01 MORBID OBESITY DUE TO EXCESS CALORIES (HCC): ICD-10-CM

## 2018-10-16 DIAGNOSIS — Z98.84 BARIATRIC SURGERY STATUS: Primary | ICD-10-CM

## 2018-10-16 PROCEDURE — 99214 OFFICE O/P EST MOD 30 MIN: CPT | Performed by: PHYSICIAN ASSISTANT

## 2018-10-16 NOTE — ASSESSMENT & PLAN NOTE
-s/p Vertical Sleeve Gastrectomy with Dr Dori Quintero on 8/21/2017  Overall doing Fair-weight is stable from her 6 month visit  She is having some heart burn-on her ppi-can be on an empty stomach or eating a little too much-encouraged on watching her volume of intakes  -Not worse with activity      Initial:330 8 lb  Current:  247  5 lb  EWL: 43% which is somewhat sub-optimal for this time frame-does report  More snacking between meals and feels restricted with her intakes working with RD//she notes she would not want a revision to her surgery  [place]: Current  Current BMI is Body mass index is 44 98 kg/m²      Tolerating a regular diet-yes  Eating at least 60 grams of protein per day-yes  Following 30/60 minute rule with liquids-yes  Drinking at least 64 ounces of fluid per day-yes  Drinking carbonated beverages-no  Sufficient exercise-no-for the past 4 weeks less because of cholecystectomy  Using NSAIDs regularly-no  Using nicotine-no  Using alcohol-no    Encouraged to continue with RD/ and consider Medical weight management with our weight-management [facility]

## 2018-10-16 NOTE — ASSESSMENT & PLAN NOTE
BP controlled/she has persistent atrial fib-rate appears controlled on medication/followed by cardiologist

## 2018-10-16 NOTE — PATIENT INSTRUCTIONS
Your vitamin-A level is mildly low    You do  not need to an extra supplement at this time  Please include high vitamin-A foods in your diet daily  Good food sources include:  Carrots, cantaloupe, sweet potato, tomato, deep green vegetables such as broccoli, burssel sprouts, asparagus, spinach,kelly, apricots, liver and Kale      Get repeat vitamin A level in March 2019-fast but drink water the night before and morning before the lab is done-you can expect a letter and/or phone call with your results after I get it back  Your LDL or "bad cholesterol" is mildly high  Continued weight loss (if needed) can help  Avoiding high fat and saturated fats in the diet can help  Please review these levels with your PCP at a routine office visit  Follow-up in 6-8 weeks- to re-evaluate heart burn    Follow-up in one year  We kindly ask that you arrive 15 minutes before your scheduled appointment time with your provider to allow you to be roomed, have your vital signs checked and your chart updated by our staff  We thank you for your patience at your visit  Follow diet as discussed  Follow  vitamin and mineral recommendations as reviewed with you  Exercise as tolerated    If you have gotten a lab slip at this visit, please note that most labs are FASTING-but you need to drink water the night before and the morning before your labs are done  It is HIGHLY RECOMMENDED that you check with your insurance to make sure all the labs ordered are covered by your individual insurance policy  This is especially important if you also get labs done by other providers outside of [facility]  You want to avoid having duplicate labs done  Note you will be given a lab slip AFTER  your annual visit next year to check your vitamin and mineral levels  You will not need to make a second appointment after the labs are received/reviewed  You will receive a letter/and or phone call with your results    Most labs do NOT honor a lab slip dated one year in advance now  Call our office if he have any problems with abdominal pain especially if associated with fever, chills, nausea, vomiting or any other concerns  All  Post-bariatric surgery patients should be aware that very small quantities of any alcohol  can cause impairment and it is very possible not to feel the effect  The effect can be in the system for several hours  It is also a stomach irritant  It is advised to AVOID alcohol, Nonsteroidal antiinflammatory drugs (NSAIDS) and nicotine of all forms   Any of these can cause stomach irritation/pain  Your vitamin D is low normal -advise to add an Extra 2000 IU vitamin D3 daily    You can use calcium carbonate or citrate with D 500-600 mg each-total should be 1500 mg between food and supplements-take in divided doses

## 2018-10-16 NOTE — ASSESSMENT & PLAN NOTE
Weight is stable from last visit   Working with RD/ for diet    I also advised her on diet today-encouraged to food log and bring to RD  She can also consider follow-up with our medical weight management NAYELY

## 2018-10-16 NOTE — ASSESSMENT & PLAN NOTE
Notes some break-through mild heart burn despite ppi daily  (She has been taking capsule whole)-sometimes has it on an empty stomach but sometimes later in the day-not worsened with activity  Advised to open the capsule and to increase to twice daily as needed    Continued weight loss may help  I would like to see her back in 6-8 weeks-if no improvement would consider UGI to evaluate further  She notes she would not be interested in a revision to her surgery so if heart burn is not controlled she MAY be a candidate for stretta  -contingent on symptoms/further work-up if needed

## 2018-10-16 NOTE — ASSESSMENT & PLAN NOTE
She stopped using Cpap in April 2018-she hated wearing it-she saw sleep medicine around one week ago at [facility]  He indicated that based on her presentation she no longer needs the Cpap but she did not have repeat sleep study  She is not snoring  She does not have daytime drowsiness

## 2018-10-16 NOTE — ASSESSMENT & PLAN NOTE
Malabsorption- patient is at risk for malabsorption of vitamins/minerals secondary to malabsorption from her procedure and restriction of intakes  Reviewed current supplements and advised on same-reviewed current lab results with her    She is taking one procare with 18 mg of iron and calcium 2-3 times per day  Advised on good food sources of vitamin A as level is just mildly low-written instructions provided

## 2018-10-16 NOTE — PROGRESS NOTES
Assessment/Plan:    Bariatric surgery status  -s/p Vertical Sleeve Gastrectomy with Dr Taiwo Sorenson on 8/21/2017  Overall doing Fair-weight is stable from her 6 month visit  She is having some heart burn-on her ppi-can be on an empty stomach or eating a little too much-encouraged on watching her volume of intakes  -Not worse with activity      Initial:330 8 lb  Current:  247  5 lb  EWL: 43% which is somewhat sub-optimal for this time frame-does report  More snacking between meals and feels restricted with her intakes working with RD//she notes she would not want a revision to her surgery  [place]: Current  Current BMI is Body mass index is 44 98 kg/m²  Tolerating a regular diet-yes  Eating at least 60 grams of protein per day-yes  Following 30/60 minute rule with liquids-yes  Drinking at least 64 ounces of fluid per day-yes  Drinking carbonated beverages-no  Sufficient exercise-no-for the past 4 weeks less because of cholecystectomy  Using NSAIDs regularly-no  Using nicotine-no  Using alcohol-no    Encouraged to continue with RD/ and consider Medical weight management with our weight-management PA-C      CPAP (continuous positive airway pressure) dependence  She stopped using Cpap in April 2018-she hated wearing it-she saw sleep medicine around one week ago at [facility]  He indicated that based on her presentation she no longer needs the Cpap but she did not have repeat sleep study  She is not snoring  She does not have daytime drowsiness  Morbid obesity due to excess calories ([address] )  Weight is stable from last visit   Working with RD/ for diet    I also advised her on diet today-encouraged to food log and bring to RD  She can also consider follow-up with our medical weight management PA-C    Essential hypertension  BP controlled/she has persistent atrial fib-rate appears controlled on medication/followed by cardiologist    Postsurgical malabsorption  Malabsorption- patient is at risk for malabsorption of vitamins/minerals secondary to malabsorption from her procedure and restriction of intakes  Reviewed current supplements and advised on same-reviewed current lab results with her    She is taking one procare with 18 mg of iron and calcium 2-3 times per day  Advised on good food sources of vitamin A as level is just mildly low-written instructions provided  Heart burn  Notes some break-through mild heart burn despite ppi daily  (She has been taking capsule whole)-sometimes has it on an empty stomach but sometimes later in the day-not worsened with activity  Advised to open the capsule and to increase to twice daily as needed    Continued weight loss may help  I would like to see her back in 6-8 weeks-if no improvement would consider UGI to evaluate further  She notes she would not be interested in a revision to her surgery so if heart burn is not controlled she MAY be a candidate for stretta  -contingent on symptoms/further work-up if needed  Low serum vitamin A  Advised to include high vitamin A foods in diet regularly -will repeat level in a few months  Mild hyperlipidemia  LDL is mildly high-continued weight loss may help-otherwise encouraged to review with PCP at routine visit for further management/if /as needed  Diagnoses and all orders for this visit:    Bariatric surgery status    Morbid obesity due to excess calories ([address] )    Essential hypertension    CPAP (continuous positive airway pressure) dependence    Heart burn    Mild hyperlipidemia    Postsurgical malabsorption          Subjective:      Patient ID: Ameya Campos is a 56 y o  female  She is here in routine follow-up  Weight is stable from her 6 month visit  She reports some break-through heart burn despite taking ppi daily  Can have symptoms on an empty stomach or after meals  Not worsened with activity  She is taking bariatric vitamins  She is doing some exercise    She notes increased snacking and is working on diet and life-style changes with our RD and   The following portions of the patient's history were reviewed and updated as appropriate: allergies, current medications, past family history, past medical history, past social history, past surgical history and problem list     Review of Systems   Constitutional: Negative for chills, fever and unexpected weight change (weight is stable from 6-month post-op visit)  Respiratory: Negative for shortness of breath and wheezing  Cardiovascular: Positive for palpitations  Negative for chest pain  Occasional palpitations   Gastrointestinal: Negative for abdominal pain, constipation, diarrhea, nausea and vomiting  Psychiatric/Behavioral: Suicidal ideas: no complait of anxiety or depression  Objective:      /64   Pulse 80   Temp 98 2 °F (36 8 °C)   Resp 18   Ht 5' 2 2" (1 58 m)   Wt 112 kg (247 lb 8 oz)   BMI 44 98 kg/m²          Physical Exam   Constitutional: She is oriented to person, place, and time  She appears well-developed and well-nourished  HENT:   Mouth/Throat: Oropharynx is clear and moist    Eyes: Conjunctivae are normal  No scleral icterus  Cardiovascular: Normal rate, regular rhythm and normal heart sounds  Pulmonary/Chest: Effort normal and breath sounds normal    Abdominal: Soft  There is no tenderness  No incisional hernias appreciated   Musculoskeletal:   Normal gait   Neurological: She is alert and oriented to person, place, and time  Psychiatric: She has a normal mood and affect  Her behavior is normal  Judgment and thought content normal    Nursing note and vitals reviewed  GOALS: Continued weight loss with good nutrition intakes    Normal vitamin and mineral levels  Exercise as tolerated  Control heart burn    BARRIERS: none identified

## 2018-10-24 ENCOUNTER — OFFICE VISIT (OUTPATIENT)
Dept: SURGERY | Facility: CLINIC | Age: 58
End: 2018-10-24

## 2018-10-24 VITALS
BODY MASS INDEX: 46.19 KG/M2 | TEMPERATURE: 97.7 F | WEIGHT: 251 LBS | SYSTOLIC BLOOD PRESSURE: 124 MMHG | HEIGHT: 62 IN | DIASTOLIC BLOOD PRESSURE: 78 MMHG

## 2018-10-24 DIAGNOSIS — Z90.49 STATUS POST LAPAROSCOPIC CHOLECYSTECTOMY: Primary | ICD-10-CM

## 2018-10-24 PROBLEM — K81.9 CHOLECYSTITIS: Status: RESOLVED | Noted: 2018-09-19 | Resolved: 2018-10-24

## 2018-10-24 PROBLEM — K80.20 CHOLELITHIASIS: Status: RESOLVED | Noted: 2017-11-30 | Resolved: 2018-10-24

## 2018-10-24 PROBLEM — K91.86 RETAINED GALLSTONES FOLLOWING OPEN CHOLECYSTECTOMY: Status: RESOLVED | Noted: 2018-02-01 | Resolved: 2018-10-24

## 2018-10-24 PROCEDURE — 99024 POSTOP FOLLOW-UP VISIT: CPT | Performed by: SURGERY

## 2018-10-24 NOTE — ASSESSMENT & PLAN NOTE
Doing well  Explained the findings of surgery to her  Allow her to increase her activity as she feels comfortable  See back if needed

## 2018-10-24 NOTE — PROGRESS NOTES
Office Visit - General Surgery  Bushra Neff MRN: [id number]  Encounter: [id number]    Assessment and Plan    Problem List Items Addressed This Visit        Other    Status post laparoscopic cholecystectomy - Primary     Doing well  Explained the findings of surgery to her  Allow her to increase her activity as she feels comfortable  See back if needed  Chief Complaint:  Bushra Neff is a 56 y o  female who presents for Post-op (p/o Lap claude )    Subjective  51-year-old female status post laparoscopic cholecystectomy to remove remnant of gallbladder left from previous surgeries  Feels well but notices some ecchymosis below the umbilical incision  Past Medical History  Past Medical History:   Diagnosis Date    Anemia     Atrial fibrillation (HCC)     CHF (congestive heart failure) (HCC)     CPAP (continuous positive airway pressure) dependence     Disease of thyroid gland     Hx of bleeding disorder     Hypertension     Irregular heart beat     Kidney stone     Sleep apnea     Wears glasses        Past Surgical History  Past Surgical History:   Procedure Laterality Date    CHOLECYSTECTOMY      partial    CYSTOSCOPY      ESOPHAGOGASTRODUODENOSCOPY N/A 8/21/2017    Procedure: ESOPHAGOGASTRODUODENOSCOPY (EGD);   Surgeon: Denita Cavanaugh MD;  [facility];  Service: Bariatrics    LITHOTRIPSY      [place] LAP, CLAUDIA RESTRICT [address], LONGITUDINAL GASTRECTOMY N/A 8/21/2017    Procedure: Gabriela Cervantes;  Surgeon: Denita Cavanaugh MD;  [facility];  Service: Audra Garvey N/A 10/11/2018    Procedure: Ann Proper;  Surgeon: Parke Fothergill, MD;  [facility];  Service: General    REMOVAL URETERAL STENT Left     URETERAL STENT PLACEMENT Left        Family History  Family History   Problem Relation Age of Onset    Diabetes Mother     Hypertension Mother     Stomach cancer Mother     No Known Problems Father Medications  Current Outpatient Prescriptions on File Prior to Visit   Medication Sig Dispense Refill    apixaban (ELIQUIS) 5 mg Take 1 tablet by mouth 2 (two) times a day  0    Biotin 1000 MCG tablet Take 1,000 mcg by mouth 3 (three) times a day      calcium citrate-vitamin d (CALCIUM CITRATE CHEWY BITE) 500-500 MG-UNIT CHEW chewable tablet Chew      carvedilol (COREG) 25 mg tablet Take 12 5 mg by mouth 2 (two) times a day with meals        docusate sodium (COLACE) 100 mg capsule Take 100 mg by mouth      levothyroxine 200 mcg tablet Take 200 mcg by mouth daily  1    methylcellulose (MIRAFIBER) 500 mg tablet Take 1,000 mg by mouth daily      Multiple Vitamin (MULTIVITAMIN) tablet Take 1 tablet by mouth daily      omeprazole (PriLOSEC) 20 mg delayed release capsule Take 1 capsule by mouth Daily      Probiotic Product (PROBIOTIC DAILY PO) Take 1 capsule by mouth daily       No current facility-administered medications on file prior to visit  Allergies  Allergies   Allergen Reactions    Other Itching     tape  tape       Review of Systems    Objective  Vitals:    10/24/18 1304   BP: 124/78   Temp: 97 7 °F (36 5 °C)       Physical Exam   Abdomen:  Laparoscopic incisions healing well   Typical healing ridge infraumbilically with some ecchymosis below the incision

## 2018-11-29 ENCOUNTER — OFFICE VISIT (OUTPATIENT)
Dept: BARIATRICS | Facility: CLINIC | Age: 58
End: 2018-11-29

## 2018-11-29 VITALS — HEIGHT: 62 IN | BODY MASS INDEX: 48.03 KG/M2 | WEIGHT: 261 LBS

## 2018-11-29 DIAGNOSIS — Z98.84 S/P LAPAROSCOPIC SLEEVE GASTRECTOMY: ICD-10-CM

## 2018-11-29 DIAGNOSIS — Z98.84 BARIATRIC SURGERY STATUS: ICD-10-CM

## 2018-11-29 DIAGNOSIS — K91.2 POSTSURGICAL MALABSORPTION: Primary | ICD-10-CM

## 2018-11-29 DIAGNOSIS — E66.01 MORBID OBESITY DUE TO EXCESS CALORIES (HCC): ICD-10-CM

## 2018-11-29 PROCEDURE — RECHECK

## 2018-11-29 NOTE — PROGRESS NOTES
Follow up appointment for post surgery weight gain  Patient gained weight since last month  "Halloween candy  I bought it for myself " Patient can clearing state she knows what she should be doing, and it not doing it  Patient has very little activity; patient feels "out of control" Discussed patient self renzo  Patient offered that she has started therapy which I was going to suggest  Patient has seen therapist once and has two more visits scheduled  Seeing Anai Owens at [facility]  Therapist had RNY done so familiar with bariatric surgery  Patient frustrated by working for months and not seeing results, several months prior to now  Patient's goal is to start journaling again and write down everything  Patient tearful that her fear is that she was going to fail  Provided emotional support for patient to be kind to herself and encouraged her to discuss issues in therapy  Patient scheduled to be seen next month

## 2018-11-29 NOTE — PROGRESS NOTES
Bariatric Follow Up Nutrition Note    Type of surgery  Vertical sleeve gastrectomy  Surgery Date: 8/21/2017  15 months  post-op  Surgeon: Dr Hugh Smyth  56 y o   female  Height 5' 2" (1 575 m), weight 118 kg (261 lb)  Body mass index is 47 74 kg/m²  13 5# wt gain from last visit  Weight on Day of Weight Loss Surgery: 292 2lbs  Weight in (lb) to have BMI = 25: 137 6lbs  Pre-Op Excess Wt: 193 2lbs  Post-Op Wt Loss: 69 9#/ 36% EBWL in 15 month(s)    Review of History and Medications   Past Medical History:   Diagnosis Date    Anemia     Atrial fibrillation (HCC)     CHF (congestive heart failure) (HCC)     CPAP (continuous positive airway pressure) dependence     Disease of thyroid gland     Hx of bleeding disorder     Hypertension     Irregular heart beat     Kidney stone     Sleep apnea     Wears glasses      Past Surgical History:   Procedure Laterality Date    CHOLECYSTECTOMY      partial    CYSTOSCOPY      ESOPHAGOGASTRODUODENOSCOPY N/A 8/21/2017    Procedure: ESOPHAGOGASTRODUODENOSCOPY (EGD);   Surgeon: Harry Cole MD;  [facility];  Service: Bariatrics    LITHOTRIPSY      [place] LAP, CLAUDIA RESTRICT PROC, LONGITUDINAL GASTRECTOMY N/A 8/21/2017    Procedure: GASTRECTOMY SLEEVE LAPAROSCOPIC;  Surgeon: Harry Cole MD;  [facility];  Service: Nica [place] LAP,CHOLECYSTECTOMY N/A 10/11/2018    Procedure: [place];  Surgeon: Fozia Juárez MD;  [facility];  Service: General    REMOVAL URETERAL STENT Left     URETERAL STENT PLACEMENT Left      Social History     Social History    Marital status: Single     Spouse name: N/A    Number of children: N/A    Years of education: N/A     Social History Main Topics    Smoking status: Never Smoker    Smokeless tobacco: Never Used    Alcohol use No    Drug use: No    Sexual activity: Not on file     Other Topics Concern    Not on file     Social History Narrative    No narrative on file       Current Outpatient Prescriptions:     apixaban (ELIQUIS) 5 mg, Take 1 tablet by mouth 2 (two) times a day, Disp: , Rfl: 0    Biotin 1000 MCG tablet, Take 1,000 mcg by mouth 3 (three) times a day, Disp: , Rfl:     calcium citrate-vitamin d (CALCIUM CITRATE CHEWY BITE) 500-500 MG-UNIT CHEW chewable tablet, Chew, Disp: , Rfl:     carvedilol (COREG) 25 mg tablet, Take 12 5 mg by mouth 2 (two) times a day with meals  , Disp: , Rfl:     docusate sodium (COLACE) 100 mg capsule, Take 100 mg by mouth, Disp: , Rfl:     levothyroxine 200 mcg tablet, Take 200 mcg by mouth daily, Disp: , Rfl: 1    methylcellulose (MIRAFIBER) 500 mg tablet, Take 1,000 mg by mouth daily, Disp: , Rfl:     Multiple Vitamin (MULTIVITAMIN) tablet, Take 1 tablet by mouth daily, Disp: , Rfl:     omeprazole (PriLOSEC) 20 mg delayed release capsule, Take 1 capsule by mouth Daily, Disp: , Rfl:     Probiotic Product (PROBIOTIC DAILY PO), Take 1 capsule by mouth daily, Disp: , Rfl:     Food Intake and Lifestyle Assessment   Food Intake Assessment completed via usual diet recall  Breakfast: yogurt with fruit  Snack: none   Lunch: salad with chicken  Snack: none  Dinner: chicken or turkey, vegetables  Snack: protein drink or protein bar  Beverage intake: water  Diet texture/stage: regular  Protein supplement: Optimum protein or Premier Protein drink, Think Thin bars  Estimated protein intake per day: 60g  Estimated fluid intake per day: 64+oz  Meals eaten away from home: 0  Typical meal pattern: 3 meals per day and 1 snacks per day  Eating Behaviors: Appropriate diet advancement, Appropriate portion sizes and Does not drink with meals and waits 30-minutes after meal before resuming drinking  Pt reports she bought herself halloween candy because she wanted it  Pt has started seeing a therapist to address her self-sabotaging behaviors  Pt has also not yet started exercising    Food allergies or intolerances: none  Cultural or Oriental orthodox considerations: none    Physical Assessment  Nutrition Related Findings  none    Physical Activity  Types of exercise: none  Current physical limitations: knee pain    Psychosocial Assessment   Support systems: friend(s)  Socioeconomic factors: none    Nutrition Diagnosis-Continued  Diagnosis: Overweight / Obesity (NC-3 3) and Altered GI function (NC-1 4)  Related to: Physical inactivity and Altered GI function  As Evidenced by: BMI >25 and Expected anthropometric outcomes are not achieved     Interventions and Teaching   Patient educated on post-op nutrition guidelines  Patient educated and handouts provided    Capacity of post-surgery stomach  Adequate hydration  Sugar and fat restriction to decrease "dumping syndrome"  Expected weight loss  Weight loss plateaus/ possibility of weight regain  Exercise  Nutrition considerations after surgery  Protein supplements  Meal planning and preparation  Appropriate carbohydrate, protein, and fat intake, and food/fluid choices to maximize safe weight loss, nutrient intake, and tolerance   Dietary and lifestyle changes  Techniques for self monitoring and keeping daily food journal  Potential for food intolerance after surgery, and ways to deal with them including: lactose intolerance, nausea, reflux, vomiting, diarrhea, food intolerance, appetite changes, gas    Education provided to: patient    Barriers to learning: Desire/Motivation    Readiness to change: action and monitoring    Comprehension: verbalizes understanding     Expected Compliance: good    Goals  Food journal, Exercise 30 minutes 5 times per week and Eat 3 meals per day  1) keep [employer] food journal  2) One piece of fruit daily    Time Spent:   30 Minutes

## 2018-12-06 ENCOUNTER — OFFICE VISIT (OUTPATIENT)
Dept: BARIATRICS | Facility: CLINIC | Age: 58
End: 2018-12-06
Payer: COMMERCIAL

## 2018-12-06 VITALS
TEMPERATURE: 97.7 F | WEIGHT: 259.5 LBS | DIASTOLIC BLOOD PRESSURE: 70 MMHG | SYSTOLIC BLOOD PRESSURE: 110 MMHG | HEIGHT: 62 IN | HEART RATE: 78 BPM | BODY MASS INDEX: 47.75 KG/M2

## 2018-12-06 DIAGNOSIS — R63.5 WEIGHT GAIN: ICD-10-CM

## 2018-12-06 DIAGNOSIS — R12 HEART BURN: ICD-10-CM

## 2018-12-06 DIAGNOSIS — Z98.84 S/P LAPAROSCOPIC SLEEVE GASTRECTOMY: Primary | ICD-10-CM

## 2018-12-06 PROCEDURE — 99214 OFFICE O/P EST MOD 30 MIN: CPT | Performed by: PHYSICIAN ASSISTANT

## 2018-12-06 NOTE — PROGRESS NOTES
Assessment/Plan:    S/P laparoscopic sleeve gastrectomy  -s/p Vertical Sleeve Gastrectomy with Dr Margy Murillo on 8/21/2017  Overall doing Poor with weight regain  She is working with our [address] and  to help with the diet  I encouraged her to review chapter 2 of her bariatric manual-she does notes she is now seeing a regular counselor and realizes she is doing some "mindless eating"    She is here to re-assess her heart burn-she increased her ppi to twice daily for a week or two and has since decreased this back to once a day-with control of symptoms  Initial: 330 8 lb  Current: 259 5 lb-up 12 lb from her last visit in October  EWL: 37% which is below average  [place]:247 lb one year post-op  Current BMI is Body mass index is 47 16 kg/m²  Tolerating a regular diet-yes  Eating at least 60 grams of protein per day-yes  Following 30/60 minute rule with liquids-yes  Drinking at least 64 ounces of fluid per day-no  Drinking carbonated beverages-no  Sufficient exercise-no  Using NSAIDs regularly-no  Using nicotine-no  Using alcohol-rare glass of wine-advised on effect after surgery and that it adds calories and may make heart burn worse      Heart burn  Now improved/controlled on medication  She had increased ppi to twice daily for a couple weeks-now down to once a day  Advised to stay on daily ppi for the next couple of months  Advised weight loss can help and encouraged her to also make follow-up with our medical weight management providers and she is agreeable to do so  Recommended if she is feeling ok in February on her ppi to trial taper to h2 blocker every other day and trial taper off ppi over the next few months as tolerated    Weight gain  Up a net 12 lb from Lyubov with RD/ and encouraged her to consider follow-up with our medical weight management staff to help her with her goals   Greater than 50% of the 25 minute visit was spent on diet education and importance of exercise to stay successful       Diagnoses and all orders for this visit:    S/P laparoscopic sleeve gastrectomy    Heart burn    Weight gain          Subjective:      Patient ID: Avery Brito is a 66 y o  female  She is here in follow-up to reassess heartburn after adjusting medication  Notes she is tolerating diet without heart burn now-on ppi once daily now  She is gaining weight -concerned with this and is working with our  [employer] and  and following with a regular counselor to help her with her weight  The following portions of the patient's history were reviewed and updated as appropriate: allergies, current medications, past family history, past medical history, past social history, past surgical history and problem list     Review of Systems   Constitutional: Positive for unexpected weight change (weight gain since October visit)  Negative for chills and fever  Respiratory: Negative for shortness of breath and wheezing  Cardiovascular: Negative for chest pain and palpitations  Gastrointestinal: Negative for abdominal pain, constipation, diarrhea, nausea and vomiting  Psychiatric/Behavioral: Suicidal ideas: no complait of anxiety or depression  Objective:      /70 (BP Location: Left arm, Patient Position: Sitting, Cuff Size: Standard)   Pulse 78   Temp 97 7 °F (36 5 °C) (Tympanic)   Ht 5' 2 2" (1 58 m)   Wt 118 kg (259 lb 8 oz)   BMI 47 16 kg/m²          Physical Exam   Constitutional: She is oriented to person, place, and time  She appears well-developed and well-nourished  HENT:   Mouth/Throat: Oropharynx is clear and moist    Eyes: Conjunctivae are normal  No scleral icterus  Cardiovascular: Normal rate, regular rhythm and normal heart sounds  Pulmonary/Chest: Effort normal and breath sounds normal    Abdominal: Soft  There is no tenderness     No incisional hernias appreciated   Musculoskeletal:   Normal gait   Neurological: She is alert and oriented to person, place, and time  Psychiatric: She has a normal mood and affect  Her behavior is normal  Judgment and thought content normal    Nursing note and vitals reviewed

## 2018-12-06 NOTE — ASSESSMENT & PLAN NOTE
-s/p Vertical Sleeve Gastrectomy with Dr Dori Quintero on 8/21/2017  Overall doing Poor with weight regain  She is working with our [address] and  to help with the diet  I encouraged her to review chapter 2 of her bariatric manual-she does notes she is now seeing a regular counselor and realizes she is doing some "mindless eating"    She is here to re-assess her heart burn-she increased her ppi to twice daily for a week or two and has since decreased this back to once a day-with control of symptoms  Initial: 330 8 lb  Current: 259 5 lb-up 12 lb from her last visit in October  EWL: 37% which is below average  [place]:247 lb one year post-op  Current BMI is Body mass index is 47 16 kg/m²      Tolerating a regular diet-yes  Eating at least 60 grams of protein per day-yes  Following 30/60 minute rule with liquids-yes  Drinking at least 64 ounces of fluid per day-no  Drinking carbonated beverages-no  Sufficient exercise-no  Using NSAIDs regularly-no  Using nicotine-no  Using alcohol-rare glass of wine-advised on effect after surgery and that it adds calories and may make heart burn worse

## 2018-12-06 NOTE — ASSESSMENT & PLAN NOTE
Now improved/controlled on medication  She had increased ppi to twice daily for a couple weeks-now down to once a day  Advised to stay on daily ppi for the next couple of months  Advised weight loss can help and encouraged her to also make follow-up with our medical weight management providers and she is agreeable to do so      Recommended if she is feeling ok in February on her ppi to trial taper to h2 blocker every other day and trial taper off ppi over the next few months as tolerated

## 2018-12-06 NOTE — PATIENT INSTRUCTIONS
Consider follow-up with our medical weight management providers to help with your weight loss goals  Can consider this after visit with Deedee Lazo and Conrad Mcdermott  Around February 2019 you can try to taper off the omeprazole and onto famotidine 20 mg twice a day IF TOLERATED- but if you need the omeprazole daily-stay on it  Follow-up in October  We kindly ask that you arrive 15 minutes before your scheduled appointment time with your provider to allow you to be roomed, have your vital signs checked and your chart updated by our staff  We thank you for your patience at your visit  Follow diet as discussed  Follow  vitamin and mineral recommendations as reviewed with you  Exercise as tolerated    If you have gotten a lab slip at this visit, please note that most labs are FASTING-but you need to drink water the night before and the morning before your labs are done  It is HIGHLY RECOMMENDED that you check with your insurance to make sure all the labs ordered are covered by your individual insurance policy  This is especially important if you also get labs done by other providers outside of [facility]  You want to avoid having duplicate labs done  Note you will be given a lab slip AFTER  your annual visit next year to check your vitamin and mineral levels  You will not need to make a second appointment after the labs are received/reviewed  You will receive a letter/and or phone call with your results  Most labs do NOT honor a lab slip dated one year in advance now  Call our office if he have any problems with abdominal pain especially if associated with fever, chills, nausea, vomiting or any other concerns  All  Post-bariatric surgery patients should be aware that very small quantities of any alcohol  can cause impairment and it is very possible not to feel the effect  The effect can be in the system for several hours  It is also a stomach irritant       It is advised to AVOID alcohol, Nonsteroidal antiinflammatory drugs (NSAIDS) and nicotine of all forms   Any of these can cause stomach irritation/pain

## 2018-12-06 NOTE — ASSESSMENT & PLAN NOTE
Up a net 12 lb from Lyubov with RD/ and encouraged her to consider follow-up with our medical weight management staff to help her with her goals   Greater than 50% of the 25 minute visit was spent on diet education and importance of exercise to stay successful

## 2018-12-20 ENCOUNTER — OFFICE VISIT (OUTPATIENT)
Dept: BARIATRICS | Facility: CLINIC | Age: 58
End: 2018-12-20

## 2018-12-20 VITALS — HEIGHT: 62 IN | WEIGHT: 261.4 LBS | BODY MASS INDEX: 48.1 KG/M2

## 2018-12-20 DIAGNOSIS — E66.01 MORBID OBESITY DUE TO EXCESS CALORIES (HCC): Primary | ICD-10-CM

## 2018-12-20 DIAGNOSIS — K91.2 POSTSURGICAL MALABSORPTION: ICD-10-CM

## 2018-12-20 DIAGNOSIS — R63.5 WEIGHT GAIN: ICD-10-CM

## 2018-12-20 DIAGNOSIS — Z98.84 BARIATRIC SURGERY STATUS: ICD-10-CM

## 2018-12-20 DIAGNOSIS — Z98.84 S/P LAPAROSCOPIC SLEEVE GASTRECTOMY: ICD-10-CM

## 2018-12-20 PROCEDURE — RECHECK: Performed by: DIETITIAN, REGISTERED

## 2018-12-20 NOTE — PROGRESS NOTES
Bariatric Follow Up Nutrition Note    Type of surgery  Vertical sleeve gastrectomy  Surgery Date: 8/21/2017  16 months  post-op  Surgeon: Dr Kendall Harris  66 y o   female  Height 5' 2" (1 575 m), weight 119 kg (261 lb 6 4 oz)  Body mass index is 47 81 kg/m²  0 4# wt gain from last RD+SW visit  Net 14  4# wt regain from lowest weight of 247# on 9/21/2018  Weight on Day of Weight Loss Surgery: 292 2lbs  Weight in (lb) to have BMI = 25: 137 6lbs  Pre-Op Excess Wt: 193 2lbs  Post-Op Wt Loss: 69 4#/ 36% EBWL in 16 month(s)    Review of History and Medications   Past Medical History:   Diagnosis Date    Anemia     Atrial fibrillation (HCC)     CHF (congestive heart failure) (HCC)     CPAP (continuous positive airway pressure) dependence     Disease of thyroid gland     Hx of bleeding disorder     Hypertension     Irregular heart beat     Kidney stone     Sleep apnea     Wears glasses      Past Surgical History:   Procedure Laterality Date    CHOLECYSTECTOMY      partial    CYSTOSCOPY      ESOPHAGOGASTRODUODENOSCOPY N/A 8/21/2017    Procedure: ESOPHAGOGASTRODUODENOSCOPY (EGD);   Surgeon: Lex Choe MD;  Location: [facility];  Service: Bariatrics    LITHOTRIPSY      AZ LAP, CLAUDIA RESTRICT PROC, LONGITUDINAL GASTRECTOMY N/A 8/21/2017    Procedure: Jey Obregon;  Surgeon: Lex Choe MD;  [facility];  Service: Lisa Moran AZ LAP,CHOLECYSTECTOMY N/A 10/11/2018    Procedure: Keiry Baez;  Surgeon: Isabell Garcia MD;  [facility];  Service: General    REMOVAL URETERAL STENT Left     URETERAL STENT PLACEMENT Left      Social History     Social History    Marital status: Single     Spouse name: N/A    Number of children: N/A    Years of education: N/A     Social History Main Topics    Smoking status: Never Smoker    Smokeless tobacco: Never Used    Alcohol use No    Drug use: No    Sexual activity: Not on file     Other Topics Concern    Not on file     Social History Narrative    No narrative on file       Current Outpatient Prescriptions:     apixaban (ELIQUIS) 5 mg, Take 1 tablet by mouth 2 (two) times a day, Disp: , Rfl: 0    Biotin 1000 MCG tablet, Take 1,000 mcg by mouth 3 (three) times a day, Disp: , Rfl:     calcium citrate-vitamin d (CALCIUM CITRATE CHEWY BITE) 500-500 MG-UNIT CHEW chewable tablet, Chew, Disp: , Rfl:     carvedilol (COREG) 25 mg tablet, Take 12 5 mg by mouth 2 (two) times a day with meals  , Disp: , Rfl:     docusate sodium (COLACE) 100 mg capsule, Take 100 mg by mouth, Disp: , Rfl:     levothyroxine 200 mcg tablet, Take 200 mcg by mouth daily, Disp: , Rfl: 1    methylcellulose (MIRAFIBER) 500 mg tablet, Take 1,000 mg by mouth daily, Disp: , Rfl:     Multiple Vitamin (MULTIVITAMIN) tablet, Take 1 tablet by mouth daily, Disp: , Rfl:     omeprazole (PriLOSEC) 20 mg delayed release capsule, Take 1 capsule by mouth Daily, Disp: , Rfl:     Probiotic Product (PROBIOTIC DAILY PO), Take 1 capsule by mouth daily, Disp: , Rfl:     Food Intake and Lifestyle Assessment   Food Intake Assessment completed via food logs brought by patient:  Patient did not food journal daily as discussed, but did food journal in [place] for the last week, with a goal of 0610-4969 calories per day  Breakfast: yogurt with fruit  Snack: none   Lunch: salad with chicken  Snack: none  Dinner: chicken or turkey, vegetables  Snack: pt regularly snacks on almonds during her work day    Beverage intake: water  Diet texture/stage: regular  Protein supplement: Optimum protein or Premier Protein drink, Think Thin bars  Estimated protein intake per day: 60g  Estimated fluid intake per day: 64+oz  Meals eaten away from home: 0  Typical meal pattern: 3 meals per day and 1+ snacks per day  Eating Behaviors: Appropriate diet advancement, Appropriate portion sizes and Does not drink with meals and waits 30-minutes after meal before resuming drinking  Pt continues to report difficulties with mindless eating and emotional eating  Food allergies or intolerances: none  Cultural or Caodaism considerations: none    Physical Assessment  Nutrition Related Findings  none    Physical Activity  Types of exercise: none  Previously discussed alternative forms of exercise in detail with pt  Pt states she knows what she wants to do, and does not know why she does not do it  Pt will sit on the couch and watch TV after work most nights  Current physical limitations: knee pain  Pt has scheduled ortho appointment to assess  Psychosocial Assessment   Support systems: friend(s)  Socioeconomic factors: none    Nutrition Diagnosis-Continued  Diagnosis: Overweight / Obesity (NC-3 3) and Altered GI function (NC-1 4)  Related to: Physical inactivity and Altered GI function  As Evidenced by: BMI >25 and Expected anthropometric outcomes are not achieved     Interventions and Teaching   Patient educated on post-op nutrition guidelines  Patient educated and handouts provided  Expected weight loss:  Pt states she would like to lose 25lbs by March  Weight loss plateaus/ possibility of weight regain  Exercise  Nutrition considerations after surgery  Meal planning and preparation  Appropriate carbohydrate, protein, and fat intake, and food/fluid choices to maximize safe weight loss, nutrient intake, and tolerance   Dietary and lifestyle changes  Techniques for self monitoring and keeping daily food journal:  Pt will continue to use [employer] de[employer]a  Education provided to: patient    Barriers to learning: Desire/Motivation    Readiness to change: contemplation and preparation  Comprehension: verbalizes understanding     Expected Compliance: fair    Goals  Food journal, Exercise 30 minutes 5 times per week and Eat 3 meals per day  1) keep b[employer]useic food journal- begun the past week  Pt to continue    2) One piece of fruit daily- not met  New goals:  Pt will schedule appointment with bariatrician to review MWM options  Pt will exercise for ten minutes three times weekly      Time Spent:   30 Minutes

## 2018-12-20 NOTE — PROGRESS NOTES
Patient seen for follow up  Patient continues to struggle with knowing what she needs to do and not doing it  Patient started food journaling; goal is to start measuring and weighing again; talked about support for exercise; PEP rally melissa? Patient has appointment with ortho physician for her knees; can only do about 3500 steps and knees cause pain; patient continues with therapy; patient made appointment with MWM program for beginning of February

## 2019-01-12 DIAGNOSIS — K21.9 GASTROESOPHAGEAL REFLUX DISEASE WITHOUT ESOPHAGITIS: Primary | ICD-10-CM

## 2019-01-14 RX ORDER — OMEPRAZOLE 20 MG/1
CAPSULE, DELAYED RELEASE ORAL
Qty: 90 CAPSULE | Refills: 3 | Status: SHIPPED | OUTPATIENT
Start: 2019-01-14 | End: 2019-10-16 | Stop reason: ALTCHOICE

## 2019-02-13 NOTE — PROGRESS NOTES
Assessment/Plan:    No problem-specific Assessment & Plan notes found for this encounter  Diagnoses and all orders for this visit:    Morbid obesity ([address] )  -     Basic metabolic panel; Future  -     Magnesium; Future  As below    Weight re-gain  -     Basic metabolic panel; Future  -     Magnesium; Future  See plan as below    S/P laparoscopic sleeve gastrectomy  -s/p Vertical Sleeve Gastrectomy with Dr Bennett Iraheta on 8/21/2017        Initial: 330 8 lb  Current: 271 12 lb-up 10 lb from her last visit in December  EWL: 36% which is below average  Current BMI is Body mass index is 49 3 kg/m²      Tolerating a regular diet-yes  Eating at least 60 grams of protein per day-yes  Following 30/60 minute rule with liquids-yes  Drinking at least 64 ounces of fluid per day-yes  Drinking carbonated beverages-no  Sufficient exercise-no  Using NSAIDs regularly-no  Using nicotine-no  Using alcohol-no     Chronic systolic CHF (congestive heart failure) (HCC)  -     Basic metabolic panel; Future  -     Magnesium; Future  Will AVOID Belviq  Persistent atrial fibrillation (HCC)  -     Basic metabolic panel; Future  -     Magnesium; Future  Stable on current regimen on Carvedilol and Eliquis  Follows with cardiology and had recent follow up   Will AVOID sympathomimetics (phentermine or Qsymia) when considering weight loss medicines  Hypothyroidism due to acquired atrophy of thyroid  -     Basic metabolic panel; Future  -     Magnesium; Future  On levothyroxine, last TSH stable at 1 37 on 7/5/18      -Discussed options of HealthyCORE-Intensive Lifestyle Intervention Program, Very Low Calorie Diet-VLCD and Conservative Program and the role of weight loss medications   -Initial weight loss goal of 5-10% weight loss for improved health  -Screening labs- reviewed, but will order more recent BMP and mag since pt has Afib to assure stable labs prior to VLCD     -Patient is interested in pursuing Very Low Calorie Diet-VLCD short term, and possible transition to Healthy core  Labs ordered: yes  HTN meds addressed: no need to adjust her carvedilol unless BP less than 110/60 with symptoms  Pt is on carvedilol for Heart rate control of Atrial fibrillation  DM2 meds addressed: N/A  VLCD time restriction based on BMI: no    Goals:  Food log (ie ) www myfitnesspal com,sparkpeople  com,loseit com,calorieking  com,etc  baritastic  No sugary beverages  At least 64oz of water daily  Increase physical activity by 10 minutes daily as tolerated (limited due to knee OA)   Provided pt with list of weight loss medicines to call her insurance and check coverage  Can consider using Contrave, Saxenda, or Topamax as weight loss medicine options in the future  (avoid Belviq and sympathomimetic as above)     45 minute visit, >50% face-to-face time spent counseling patient on surgical and nonsurgical interventions for the treatment of excess weight  Discussed the advantages and long-term outcomes with regards to bariatric surgery  Discussed in detail nonsurgical options including intensive lifestyle intervention program, very low-calorie diet program and conservative program   Discussed the role of weight loss medications  Counseled patient on diet behavior and exercise modification for weight loss  Follow up in approximately 2 weeks with Non-Surgical Dietician and 6 weeks with me  Subjective:   Chief Complaint   Patient presents with    Follow-up     Gastric sleeve 8/2017, weight gain        Patient ID: Donalda Cooks  is a 66 y o  female with excess weight/obesity here to pursue weight management      Past Medical History:   Diagnosis Date    Anemia     Atrial fibrillation (HCC)     CHF (congestive heart failure) (HCC)     CPAP (continuous positive airway pressure) dependence     Disease of thyroid gland     Hx of bleeding disorder     Hypertension     Irregular heart beat     Kidney stone     Sleep apnea     Wears glasses HPI:    Pt presents for a MWM consult after weight regain post sleeve gastrectomy  Pt is S/p Sleeve gastrectomy by Dr Florentino Favre on 8/21/17  She lost a total of 124lbs  Pt experience weight regain since Oct 2018 of about 29lbs  Obesity  Severity: class 3    Goals: under 200lbs  Hydration: water- 68oz a day  Alcohol: no  NSAIDs use: no  Exercise: limited bc of knee OA and pain  Not interested in pool exercises as she doesn't want to wear a swim suit  Sleep: 6hrs  Food logging 75% of the time  Work at at home as a       She struggles with exercise due to knee pain/oa, plans for possible knee surgery in the future as knee injections in the past have not helped  This has been affecting her even before the weight regain  She admits to stress eating  She is currently going to loss her job in 1 month and in unsure of her future, plans to look for new job but not sure if it will require relocation  This is causing stress right now  Stress affects also her motivation  She was seeing a behavioral counselor to help her with her food addiction and CBT but she felt like it wasn't helping and she continue with weight gain, she completed 7 sessions but DC after no changes  She still struggles with stress eating  The following portions of the patient's history were reviewed and updated as appropriate: allergies, current medications, past family history, past medical history, past social history, past surgical history and problem list     Review of Systems   Constitutional: Negative for activity change and appetite change  HENT: Negative  Respiratory: Negative  Cardiovascular: Negative  Gastrointestinal: Negative  Genitourinary: Negative  Musculoskeletal: Negative for arthralgias  Skin: Negative for rash  Psychiatric/Behavioral: Negative          Objective:    /80 (BP Location: Left arm, Patient Position: Sitting, Cuff Size: Adult)   Pulse 55   Temp 97 8 °F (36 6 °C) (Tympanic)   Resp 18   Ht 5' 2 2" (1 58 m)   Wt 123 kg (271 lb 12 8 oz)   BMI 49 39 kg/m²     Physical Exam   Constitutional   General appearance: Abnormal   well developed and morbidly obese  Eyes No conjunctival pallor  Ears, Nose, Mouth, and Throat Oral mucosa moist    Pulmonary   Respiratory effort: No increased work of breathing or signs of respiratory distress  Auscultation of lungs: Clear to auscultation, equal breath sounds bilaterally, no wheezes, no rales, no rhonci  Cardiovascular   Auscultation of heart: Normal rate and rhythm, normal S1 and S2, without murmurs  Examination of extremities for edema and/or varicosities: Normal   no edema  Abdomen   Abdomen: Abnormal   The abdomen was obese  Bowel sounds were normal  The abdomen was soft and nontender     Musculoskeletal   Gait and station: Normal     Psychiatric   Orientation to person, place and time: Normal     Affect: appropriate

## 2019-02-15 ENCOUNTER — OFFICE VISIT (OUTPATIENT)
Dept: BARIATRICS | Facility: CLINIC | Age: 59
End: 2019-02-15
Payer: COMMERCIAL

## 2019-02-15 VITALS
RESPIRATION RATE: 18 BRPM | WEIGHT: 271.8 LBS | HEIGHT: 62 IN | TEMPERATURE: 97.8 F | DIASTOLIC BLOOD PRESSURE: 80 MMHG | HEART RATE: 55 BPM | SYSTOLIC BLOOD PRESSURE: 130 MMHG | BODY MASS INDEX: 50.02 KG/M2

## 2019-02-15 DIAGNOSIS — E03.4 HYPOTHYROIDISM DUE TO ACQUIRED ATROPHY OF THYROID: ICD-10-CM

## 2019-02-15 DIAGNOSIS — Z98.84 S/P LAPAROSCOPIC SLEEVE GASTRECTOMY: ICD-10-CM

## 2019-02-15 DIAGNOSIS — I50.22 CHRONIC SYSTOLIC CHF (CONGESTIVE HEART FAILURE) (HCC): ICD-10-CM

## 2019-02-15 DIAGNOSIS — I42.9 CARDIOMYOPATHY, UNSPECIFIED TYPE (HCC): ICD-10-CM

## 2019-02-15 DIAGNOSIS — R63.5 WEIGHT GAIN: ICD-10-CM

## 2019-02-15 DIAGNOSIS — I10 ESSENTIAL HYPERTENSION: ICD-10-CM

## 2019-02-15 DIAGNOSIS — E78.5 MILD HYPERLIPIDEMIA: ICD-10-CM

## 2019-02-15 DIAGNOSIS — I48.19 PERSISTENT ATRIAL FIBRILLATION (HCC): ICD-10-CM

## 2019-02-15 DIAGNOSIS — E66.01 MORBID OBESITY (HCC): Primary | ICD-10-CM

## 2019-02-15 PROBLEM — K91.2 POSTGASTRECTOMY MALABSORPTION: Status: ACTIVE | Noted: 2017-08-29

## 2019-02-15 PROBLEM — Z90.3 POSTGASTRECTOMY MALABSORPTION: Status: ACTIVE | Noted: 2017-08-29

## 2019-02-15 PROCEDURE — 99215 OFFICE O/P EST HI 40 MIN: CPT | Performed by: FAMILY MEDICINE

## 2019-02-26 ENCOUNTER — TRANSCRIBE ORDERS (OUTPATIENT)
Dept: ADMINISTRATIVE | Facility: HOSPITAL | Age: 59
End: 2019-02-26

## 2019-02-26 ENCOUNTER — APPOINTMENT (OUTPATIENT)
Dept: LAB | Facility: IMAGING CENTER | Age: 59
End: 2019-02-26
Payer: COMMERCIAL

## 2019-02-26 DIAGNOSIS — I10 ESSENTIAL HYPERTENSION: ICD-10-CM

## 2019-02-26 DIAGNOSIS — E66.01 MORBID OBESITY (HCC): ICD-10-CM

## 2019-02-26 DIAGNOSIS — I48.19 PERSISTENT ATRIAL FIBRILLATION (HCC): ICD-10-CM

## 2019-02-26 DIAGNOSIS — I10 ESSENTIAL HYPERTENSION, MALIGNANT: ICD-10-CM

## 2019-02-26 DIAGNOSIS — E55.9 AVITAMINOSIS D: ICD-10-CM

## 2019-02-26 DIAGNOSIS — Z98.84 S/P LAPAROSCOPIC SLEEVE GASTRECTOMY: ICD-10-CM

## 2019-02-26 DIAGNOSIS — I51.9 MYXEDEMA HEART DISEASE: ICD-10-CM

## 2019-02-26 DIAGNOSIS — E78.5 MILD HYPERLIPIDEMIA: ICD-10-CM

## 2019-02-26 DIAGNOSIS — E03.9 MYXEDEMA HEART DISEASE: ICD-10-CM

## 2019-02-26 DIAGNOSIS — E03.4 HYPOTHYROIDISM DUE TO ACQUIRED ATROPHY OF THYROID: ICD-10-CM

## 2019-02-26 DIAGNOSIS — E03.9 MYXEDEMA HEART DISEASE: Primary | ICD-10-CM

## 2019-02-26 DIAGNOSIS — I50.22 CHRONIC SYSTOLIC CHF (CONGESTIVE HEART FAILURE) (HCC): ICD-10-CM

## 2019-02-26 DIAGNOSIS — R63.5 WEIGHT GAIN: ICD-10-CM

## 2019-02-26 DIAGNOSIS — E78.5 HYPERLIPIDEMIA, UNSPECIFIED HYPERLIPIDEMIA TYPE: ICD-10-CM

## 2019-02-26 DIAGNOSIS — I51.9 MYXEDEMA HEART DISEASE: Primary | ICD-10-CM

## 2019-02-26 DIAGNOSIS — I42.9 CARDIOMYOPATHY, UNSPECIFIED TYPE (HCC): ICD-10-CM

## 2019-02-26 LAB
25(OH)D3 SERPL-MCNC: 47.7 NG/ML (ref 30–100)
ALBUMIN SERPL BCP-MCNC: 3.9 G/DL (ref 3.5–5)
ALP SERPL-CCNC: 71 U/L (ref 46–116)
ALT SERPL W P-5'-P-CCNC: 18 U/L (ref 12–78)
ANION GAP SERPL CALCULATED.3IONS-SCNC: 2 MMOL/L (ref 4–13)
AST SERPL W P-5'-P-CCNC: 19 U/L (ref 5–45)
BASOPHILS # BLD AUTO: 0.06 THOUSANDS/ΜL (ref 0–0.1)
BASOPHILS NFR BLD AUTO: 1 % (ref 0–1)
BILIRUB SERPL-MCNC: 0.7 MG/DL (ref 0.2–1)
BUN SERPL-MCNC: 16 MG/DL (ref 5–25)
CALCIUM SERPL-MCNC: 9.6 MG/DL (ref 8.3–10.1)
CHLORIDE SERPL-SCNC: 103 MMOL/L (ref 100–108)
CO2 SERPL-SCNC: 31 MMOL/L (ref 21–32)
CREAT SERPL-MCNC: 0.6 MG/DL (ref 0.6–1.3)
EOSINOPHIL # BLD AUTO: 0.26 THOUSAND/ΜL (ref 0–0.61)
EOSINOPHIL NFR BLD AUTO: 5 % (ref 0–6)
ERYTHROCYTE [DISTWIDTH] IN BLOOD BY AUTOMATED COUNT: 15.4 % (ref 11.6–15.1)
GFR SERPL CREATININE-BSD FRML MDRD: 102 ML/MIN/1.73SQ M
GLUCOSE P FAST SERPL-MCNC: 87 MG/DL (ref 65–99)
HCT VFR BLD AUTO: 46.6 % (ref 34.8–46.1)
HGB BLD-MCNC: 14.6 G/DL (ref 11.5–15.4)
IMM GRANULOCYTES # BLD AUTO: 0 THOUSAND/UL (ref 0–0.2)
IMM GRANULOCYTES NFR BLD AUTO: 0 % (ref 0–2)
LYMPHOCYTES # BLD AUTO: 2.2 THOUSANDS/ΜL (ref 0.6–4.47)
LYMPHOCYTES NFR BLD AUTO: 38 % (ref 14–44)
MAGNESIUM SERPL-MCNC: 2.3 MG/DL (ref 1.6–2.6)
MCH RBC QN AUTO: 26.8 PG (ref 26.8–34.3)
MCHC RBC AUTO-ENTMCNC: 31.3 G/DL (ref 31.4–37.4)
MCV RBC AUTO: 86 FL (ref 82–98)
MONOCYTES # BLD AUTO: 0.4 THOUSAND/ΜL (ref 0.17–1.22)
MONOCYTES NFR BLD AUTO: 7 % (ref 4–12)
NEUTROPHILS # BLD AUTO: 2.87 THOUSANDS/ΜL (ref 1.85–7.62)
NEUTS SEG NFR BLD AUTO: 49 % (ref 43–75)
NRBC BLD AUTO-RTO: 0 /100 WBCS
PLATELET # BLD AUTO: 190 THOUSANDS/UL (ref 149–390)
PMV BLD AUTO: 11.7 FL (ref 8.9–12.7)
POTASSIUM SERPL-SCNC: 4.4 MMOL/L (ref 3.5–5.3)
PROT SERPL-MCNC: 8.4 G/DL (ref 6.4–8.2)
RBC # BLD AUTO: 5.45 MILLION/UL (ref 3.81–5.12)
SODIUM SERPL-SCNC: 136 MMOL/L (ref 136–145)
TSH SERPL DL<=0.05 MIU/L-ACNC: 2.76 UIU/ML (ref 0.36–3.74)
WBC # BLD AUTO: 5.79 THOUSAND/UL (ref 4.31–10.16)

## 2019-02-26 PROCEDURE — 84443 ASSAY THYROID STIM HORMONE: CPT

## 2019-02-26 PROCEDURE — 80053 COMPREHEN METABOLIC PANEL: CPT

## 2019-02-26 PROCEDURE — 36415 COLL VENOUS BLD VENIPUNCTURE: CPT

## 2019-02-26 PROCEDURE — 83735 ASSAY OF MAGNESIUM: CPT

## 2019-02-26 PROCEDURE — 82306 VITAMIN D 25 HYDROXY: CPT

## 2019-02-26 PROCEDURE — 85025 COMPLETE CBC W/AUTO DIFF WBC: CPT

## 2019-03-14 ENCOUNTER — OFFICE VISIT (OUTPATIENT)
Dept: BARIATRICS | Facility: CLINIC | Age: 59
End: 2019-03-14

## 2019-03-14 VITALS — BODY MASS INDEX: 49.82 KG/M2 | WEIGHT: 270.7 LBS | HEIGHT: 62 IN

## 2019-03-14 DIAGNOSIS — R63.5 ABNORMAL WEIGHT GAIN: ICD-10-CM

## 2019-03-14 PROCEDURE — VLCD: Performed by: DIETITIAN, REGISTERED

## 2019-03-14 PROCEDURE — RECHECK: Performed by: DIETITIAN, REGISTERED

## 2019-03-21 ENCOUNTER — PATIENT OUTREACH (OUTPATIENT)
Dept: BARIATRICS | Facility: CLINIC | Age: 59
End: 2019-03-21

## 2019-04-01 ENCOUNTER — OFFICE VISIT (OUTPATIENT)
Dept: BARIATRICS | Facility: CLINIC | Age: 59
End: 2019-04-01
Payer: COMMERCIAL

## 2019-04-01 VITALS — BODY MASS INDEX: 49.19 KG/M2 | HEIGHT: 62 IN

## 2019-04-01 DIAGNOSIS — R63.5 ABNORMAL WEIGHT GAIN: Primary | ICD-10-CM

## 2019-04-01 PROCEDURE — RECHECK: Performed by: DIETITIAN, REGISTERED

## 2019-04-01 PROCEDURE — VLCD: Performed by: DIETITIAN, REGISTERED

## 2019-05-28 ENCOUNTER — OFFICE VISIT (OUTPATIENT)
Dept: BARIATRICS | Facility: CLINIC | Age: 59
End: 2019-05-28

## 2019-05-28 VITALS — WEIGHT: 266.1 LBS | BODY MASS INDEX: 48.36 KG/M2

## 2019-05-28 DIAGNOSIS — R63.5 ABNORMAL WEIGHT GAIN: Primary | ICD-10-CM

## 2019-05-28 PROCEDURE — RECHECK: Performed by: DIETITIAN, REGISTERED

## 2019-05-28 PROCEDURE — DB6PK: Performed by: DIETITIAN, REGISTERED

## 2019-05-29 ENCOUNTER — OFFICE VISIT (OUTPATIENT)
Dept: BARIATRICS | Facility: CLINIC | Age: 59
End: 2019-05-29

## 2019-05-29 DIAGNOSIS — E66.01 MORBID OBESITY (HCC): Primary | ICD-10-CM

## 2019-05-29 PROCEDURE — RECHECK

## 2019-07-03 ENCOUNTER — OFFICE VISIT (OUTPATIENT)
Dept: BARIATRICS | Facility: CLINIC | Age: 59
End: 2019-07-03

## 2019-07-03 VITALS — WEIGHT: 269.7 LBS | BODY MASS INDEX: 49.01 KG/M2

## 2019-07-03 DIAGNOSIS — Z98.84 BARIATRIC SURGERY STATUS: Primary | ICD-10-CM

## 2019-07-03 PROCEDURE — RECHECK

## 2019-07-03 NOTE — PROGRESS NOTES
Follow up visit  Patient continues to use cane recovering from knee surgery  Patient joined [place] [address] gym and has program to follow  Patient hesitant to go alone; encouraged her to post on FB or talk at [place] katherine that she's looking for a gym melissa  Patient cleaned out her house and now needs to shop for healthy choices  Patient is participating in MWM program, although lost 10 lbs then gained it back  Patient has calendar to follow; patient does not have established routine  Patient sees working on her health as her "job" since she's not working  Expressed fear that her surgery has been "a waste' as she is gaining weight back  Patient goals for this month: 1  Develop meal habits with shakes, supplements and healthy choices  2  Start going to the gym  Scheduled to see patient next month

## 2019-07-30 ENCOUNTER — OFFICE VISIT (OUTPATIENT)
Dept: BARIATRICS | Facility: CLINIC | Age: 59
End: 2019-07-30

## 2019-07-30 VITALS — BODY MASS INDEX: 50.57 KG/M2 | HEIGHT: 62 IN | WEIGHT: 274.8 LBS

## 2019-07-30 DIAGNOSIS — R63.5 ABNORMAL WEIGHT GAIN: Primary | ICD-10-CM

## 2019-07-30 DIAGNOSIS — Z98.84 BARIATRIC SURGERY STATUS: ICD-10-CM

## 2019-07-30 PROCEDURE — RECHECK: Performed by: DIETITIAN, REGISTERED

## 2019-07-30 NOTE — PROGRESS NOTES
Weight Management Medical Nutrition Assessment  Patient presents for RD bundle visit 2 of 6  Today's weight is 274 8#  Patient has gained 8 7# in 2 months  Overall 4 1# gained x 4 months  Patient is discouraged by her lack of progress and motivation  Discussed focusing on the next 2 weeks  Set goals for exercise 3 days per week (patient wants to use her 2 week pass to [address]) and to return to using [employer] meal replacements  [address] in 2 weeks and surgical SW in 4 weeks  Patient will be 2 years post-op in August 2019 (mustapha with Dr Ashlee Saucedo)- has appointment for annual in oct 2019 with PA  Currently taking Procare MVI capsule, Citrical calicum citrate capsule 600mg twice daily  Wants to return to ketosis  Anthropometric Measurements  Start Weight (lbs): 270 7  Current Weight (lbs): 274 8  TBW % Change from start weight:   Ideal Body Weight (lbs): 111lbs (50 5kg)  Goal Weight (lbs):     Weight Loss History  Previous weight loss attempts:  sleeve 8/21/2017 (Dr Ashlee Saucedo)    Food and Nutrition Related History  Wake up: 6:30am   Bed Time: 11pm    Food Recall-has not been using product nor has an eating schedule  8am Breakfast:  10am Snack:1/2 bar  Lunch:11:30am hot dog with bun, 1/3 potato salad, 1/4 cup baked beans  Snack:potato chips, cream cheese pie 1 slice  Dinner: 3oz bbq crock pot chicken  Snack:     Beverages:  water, powerade zero  Volume of beverage intake: 80oz     Weekends: Same  Cravings: sweets  Trouble area of day:evening    Frequency of Eating out: not assessed  Food restrictions:post-op patient, improved practice of 30/60 rule  Cooking: self  Food Shopping: self has healthier choices at home but also poor snack choices    Physical Activity Intake  Activity: completed fitness assessment at [address]  Frequency:infrequently  Physical limitations/barriers to exercise: right knee replacement surgery on 4/4/2019    Estimated Needs  Energy  [employer] Needs:  BMR : 2790 1-2# loss weekly sedentary:  5142-3038            1-2# loss weekly lightly active:7442-4792  Protein:61-76      (1 2-1 5g/kg IBW)  Fluid: 59oz     (35mL/kg IBW)    Nutrition Diagnosis  Yes;     Overweight/obesity  related to Physical inactivity as evidenced by  BMI more than normative standard for age and sex (obesity-grade III 36+)     Nutrition Intervention    Nutrition Prescription  Calories:1300  Protein:75grams  Fluid:64oz    Meal Plan   2 replacements and 1 bar  Breakfast: meal replacement  200/27/10  Snack: low carb snack  200/0-5/0  Lunch: meal replacement  200/27/10  Snack: ND protein bar   160/15/13  Dinner: 5oz very lean meat,   175/35/-  1 cup cooked non starchy vegetables, 75/6/15  1 tbsp olive oil    100/0/0  Snack:low carb snack   200/0-5/-    Nutrition Education:    Calorie controlled menu  Lean protein food choices  Healthy snack options  Food journaling tips    Nutrition Counseling:  Strategies: meal planning, portion sizes, healthy snack choices, hydration, fiber intake, protein intake, exercise, food journal      Monitoring and Evaluation:  Evaluation criteria:  Energy Intake  Meet protein needs  Maintain adequate hydration  Monitor weekly weight  Meal planning/preparation  Food journal - Carb manager debora  Decreased portions at mealtimes and snacks  Physical activity - as per PT/MD    Barriers to learning:none  Readiness to change: Preparation  Comprehension: good  Expected Compliance: good

## 2019-08-13 ENCOUNTER — OFFICE VISIT (OUTPATIENT)
Dept: BARIATRICS | Facility: CLINIC | Age: 59
End: 2019-08-13

## 2019-08-13 VITALS — BODY MASS INDEX: 49.56 KG/M2 | HEIGHT: 62 IN | WEIGHT: 269.3 LBS

## 2019-08-13 DIAGNOSIS — R63.5 ABNORMAL WEIGHT GAIN: Primary | ICD-10-CM

## 2019-08-13 PROCEDURE — RECHECK

## 2019-08-13 NOTE — PROGRESS NOTES
Weight Management Medical Nutrition Assessment  Patient presents for RD bundle visit 3 of 6  Today's weight is 269 3# Loss of 5 5# in 2 weeks  Goal for this visit was to go to fitness center Wants to start 2 wk pass after next vacation  Did attempt to go to the gym after last visit but sat in the parking lot and cried because she was scared to go in  Reassurance provided  She is going on vacation next week  Suggestions given and plan discussed  She will f/u with surgical SW in 2 wks and RD in 4 wks  Patient will be 2 years post-op in August 2019 (mustapha with Dr Bree Rosado)- has appointment for annual in oct 2019 with PA  Currently taking Procare MVI capsule, Citrical calicum citrate capsule 600mg twice daily  Anthropometric Measurements  Start Weight (lbs): 270 7  Current Weight (lbs):  269 3 lbs  TBW % Change from start weight:   Ideal Body Weight (lbs): 111lbs (50 5kg)  Goal Weight (lbs):     Weight Loss History  Previous weight loss attempts:  sleeve 8/21/2017 (Dr Bree Rosado)    Food and Nutrition Related History  Wake up: 6:30am   Bed Time: 11pm    Food Recall-  8am Breakfast: meal replacement  10am Snack: skip  Lunch:11:00am  Breaded chicken nuggets (chick irina)  Snack:potato chips, cream cheese pie 1 slice  Dinner: chick irina grilled chicken salad  Snack: cantaloupe    Beverages:  water, powerade zero  Volume of beverage intake: 50-60 oz     Weekends: Same  Cravings: sweets  Trouble area of day:evening    Frequency of Eating out: not assessed  Food restrictions:post-op patient, improved practice of 30/60 rule  Cooking: self  Food Shopping: self has healthier choices at home but also poor snack choices    Physical Activity Intake  Activity: completed fitness assessment at [address]  Frequency:infrequently  Physical limitations/barriers to exercise: right knee replacement surgery on 4/4/2019    Estimated Needs  Energy  [employer] Needs:  BMR :  9969, 1-2# loss weekly sedentary: 3059-5361 1-2# loss weekly lightly active: 2727-8878  Protein:61-76      (1 2-1 5g/kg IBW)  Fluid: 59oz     (35mL/kg IBW)    Nutrition Diagnosis  Yes;     Overweight/obesity  related to Physical inactivity as evidenced by  BMI more than normative standard for age and sex (obesity-grade III 36+)     Nutrition Intervention    Nutrition Prescription  Calories:1300  Protein:75grams  Fluid:64oz    Meal Plan   2 replacements and 1 bar  Breakfast: meal replacement  200/27/10  Snack: low carb snack  200/0-5/0  Lunch: meal replacement  200/27/10  Snack: ND protein bar   160/15/13  Dinner: 5oz very lean meat,   175/35/-  1 cup cooked non starchy vegetables, 75/6/15  1 tbsp olive oil    100/0/0  Snack:low carb snack   200/0-5/-    Nutrition Education:    Calorie controlled menu  Lean protein food choices  Healthy snack options  Food journaling tips    Nutrition Counseling:  Strategies: meal planning, portion sizes, healthy snack choices, hydration, fiber intake, protein intake, exercise, food journal      Monitoring and Evaluation:  Evaluation criteria:  Energy Intake  Meet protein needs  Maintain adequate hydration  Monitor weekly weight  Meal planning/preparation  Food journal - Carb manager debora  Decreased portions at mealtimes and snacks  Physical activity - as per PT/MD  vacation    Barriers to learning:none  Readiness to change: Preparation  Comprehension: good  Expected Compliance: good

## 2019-08-28 ENCOUNTER — OFFICE VISIT (OUTPATIENT)
Dept: BARIATRICS | Facility: CLINIC | Age: 59
End: 2019-08-28

## 2019-08-28 VITALS — BODY MASS INDEX: 50.26 KG/M2 | WEIGHT: 274.8 LBS

## 2019-08-28 DIAGNOSIS — R63.5 WEIGHT GAIN: Primary | ICD-10-CM

## 2019-08-28 PROCEDURE — RECHECK

## 2019-08-28 NOTE — PROGRESS NOTES
WT [facility]/Support visit  Patient gained 5 lbs since last visit  Patient reports being on vacation with family and not able to follow guidelines  Patient's goal to get back on track  Cleaned junk food out of her house and shopped for healthier choices  Patient had assessment and weight training program set up with [facility]  Drove to gym; could not make herself go in to work out  Offered to meet her there to provide support and get her over the hurtle  She will think about it  Goals for this month: 1  Drink 80 oz water a day  2  Food log on [employer] debora  3  Find a job  Patient scheduled to be seen next month

## 2019-09-17 ENCOUNTER — OFFICE VISIT (OUTPATIENT)
Dept: BARIATRICS | Facility: CLINIC | Age: 59
End: 2019-09-17

## 2019-09-17 VITALS — HEIGHT: 62 IN | BODY MASS INDEX: 50.27 KG/M2 | WEIGHT: 273.2 LBS

## 2019-09-17 DIAGNOSIS — R63.5 ABNORMAL WEIGHT GAIN: ICD-10-CM

## 2019-09-17 DIAGNOSIS — Z98.84 BARIATRIC SURGERY STATUS: Primary | ICD-10-CM

## 2019-09-17 PROCEDURE — RECHECK: Performed by: DIETITIAN, REGISTERED

## 2019-09-17 NOTE — PROGRESS NOTES
Weight Management Medical Nutrition Assessment  Patient presents for RD bundle visit 4 of 6  Today's weight is 273 2# Loss of 1 4# in 2 weeks  Patient continues to struggle with physical activity but has returned to food journaling [employer] debora  Focused on encouraging patient to continue with this and for consistency as patient is feeling nothing is working for her but she typically does not stick with something for longer than 1 week  Patient will see Surgical SW in 1 month along with surgical PA for annual f/u  Patient will be 2 years post-op in August 2019 (mustapha with Dr Javid Ha)- has appointment for annual in oct 2019 with PA  Currently taking Procare MVI capsule, Citrical calicum citrate capsule 600mg twice daily  Anthropometric Measurements  Start Weight (lbs): 270 7  Current Weight (lbs):  269 3 lbs  TBW % Change from start weight:   Ideal Body Weight (lbs): 111lbs (50 5kg)  Goal Weight (lbs):     Weight Loss History  Previous weight loss attempts:  sleeve 8/21/2017 (Dr Javid Ha)    Food and Nutrition Related History  Wake up: 6:30am   Bed Time: 11pm    Food Recall-  8am Breakfast: meal replacement  10am Snack: skip  Lunch:11:00am  Breaded chicken nuggets (chick irina)  Snack:potato chips, cream cheese pie 1 slice  Dinner: chick irina grilled chicken salad  Snack: cantaloupe    Beverages:  water, powerade zero, propel  Volume of beverage intake: 48-64oz     Weekends: Same  Cravings: sweets  Trouble area of day:evening    Frequency of Eating out: not assessed  Food restrictions:post-op patient, improved practice of 30/60 rule  Cooking: self  Food Shopping: self has healthier choices at home but also poor snack choices    Physical Activity Intake  Activity: completed fitness assessment at [address]  Frequency:infrequently  Physical limitations/barriers to exercise: right knee replacement surgery on 4/4/2019    Estimated Needs  Energy  [employer] Needs:  BMR :  3114, 1-2# loss weekly sedentary: 3469-6698            1-2# loss weekly lightly active: 3704-0906  Protein:61-76      (1 2-1 5g/kg IBW)  Fluid: 59oz     (35mL/kg IBW)    Nutrition Diagnosis  Yes;     Overweight/obesity  related to Physical inactivity as evidenced by  BMI more than normative standard for age and sex (obesity-grade III 36+)     Nutrition Intervention    Nutrition Prescription  Calories:1300  Protein:75grams  Fluid:64oz    Meal Plan   2 replacements and 1 bar  Breakfast: meal replacement  200/27/10  Snack: low carb snack  200/0-5/0  Lunch: meal replacement  200/27/10  Snack: ND protein bar   160/15/13  Dinner: 5oz very lean meat,   175/35/-  1 cup cooked non starchy vegetables, 75/6/15  1 tbsp olive oil    100/0/0  Snack:low carb snack   200/0-5/-    Nutrition Education:    Calorie controlled menu  Lean protein food choices  Healthy snack options  Food journaling tips    Nutrition Counseling:  Strategies: meal planning, portion sizes, healthy snack choices, hydration, fiber intake, protein intake, exercise, food journal      Monitoring and Evaluation:  Evaluation criteria:  Energy Intake  Meet protein needs  Maintain adequate hydration  Monitor weekly weight  Meal planning/preparation  Food journal - Carb manager debora  Decreased portions at mealtimes and snacks  Physical activity - as per PT/MD  vacation    Barriers to learning:none  Readiness to change: Preparation  Comprehension: good  Expected Compliance: good

## 2019-10-16 ENCOUNTER — OFFICE VISIT (OUTPATIENT)
Dept: BARIATRICS | Facility: CLINIC | Age: 59
End: 2019-10-16
Payer: COMMERCIAL

## 2019-10-16 VITALS
DIASTOLIC BLOOD PRESSURE: 80 MMHG | HEART RATE: 97 BPM | HEIGHT: 62 IN | WEIGHT: 278.5 LBS | BODY MASS INDEX: 51.25 KG/M2 | SYSTOLIC BLOOD PRESSURE: 124 MMHG

## 2019-10-16 DIAGNOSIS — E03.4 HYPOTHYROIDISM DUE TO ACQUIRED ATROPHY OF THYROID: ICD-10-CM

## 2019-10-16 DIAGNOSIS — K91.2 POSTGASTRECTOMY MALABSORPTION: ICD-10-CM

## 2019-10-16 DIAGNOSIS — E78.00 PURE HYPERCHOLESTEROLEMIA: ICD-10-CM

## 2019-10-16 DIAGNOSIS — Z90.3 POSTGASTRECTOMY MALABSORPTION: ICD-10-CM

## 2019-10-16 DIAGNOSIS — E66.01 MORBID (SEVERE) OBESITY DUE TO EXCESS CALORIES (HCC): ICD-10-CM

## 2019-10-16 DIAGNOSIS — R12 HEART BURN: ICD-10-CM

## 2019-10-16 DIAGNOSIS — R79.89 LOW SERUM VITAMIN A: ICD-10-CM

## 2019-10-16 DIAGNOSIS — Z98.84 BARIATRIC SURGERY STATUS: Primary | ICD-10-CM

## 2019-10-16 DIAGNOSIS — R73.03 PRE-DIABETES: ICD-10-CM

## 2019-10-16 PROCEDURE — 99214 OFFICE O/P EST MOD 30 MIN: CPT | Performed by: PHYSICIAN ASSISTANT

## 2019-10-16 NOTE — ASSESSMENT & PLAN NOTE
Worse from prior labs  This is being followed by her PCP  Weight loss could help/currently she is not on medication for this

## 2019-10-16 NOTE — ASSESSMENT & PLAN NOTE
She is working with our medical weight management providers to help her with her weight loss-feels discouraged but notes she is not following diet "like I should"-not really exercising  I encouraged her to do very short intervals of walking as tolerated or other exercises as tolerated in shorter increments  She does report she is out of a job and her mood is likely affecting her compliance as well  She is working with our  as well and encouraged same  She is up 5 lb from her September weight

## 2019-10-16 NOTE — ASSESSMENT & PLAN NOTE
-s/p Oren-En-Y Gastric Bypass with Dr Mateusz Galvez on 8/21/2017  Overall doing Fair  She is up 5 1/2 lb from mid September  She has been working with our medical weight management staff-feels discouraged -but notes "I'm not following all I need to do"-I encouraged her to follow advice and discuss struggles with medical weight management providers    At this time she is not interested in considering a revision to her surgery-she does report she feels appropriately restricted    Initial:330 8 lb  Current: 278 5 lb  EWL: 27%   [place]:247 5 lb  Current BMI is Body mass index is 50 61 kg/m²      Tolerating a regular diet-yes  Eating at least 60 grams of protein per day-yes  Following 30/60 minute rule with liquids-yes  Drinking at least 64 ounces of fluid per day-yes  Drinking carbonated beverages-no  Sufficient exercise-no  Using NSAIDs regularly-no  Using nicotine-no  Using alcohol-no    Colonsocopy/colon cancer screening is up-to-date as reported by patient  She reports she will be due for repeat in July 2020

## 2019-10-16 NOTE — PATIENT INSTRUCTIONS
Follow-up with medical weight management as scheduled  Follow-up in one year  We kindly ask that you arrive 15 minutes before your scheduled appointment time with your provider to allow you to be roomed, have your vital signs checked and your chart updated by our staff  We thank you for your patience at your visit  Follow diet as discussed  Follow  vitamin and mineral recommendations as reviewed with you  Bariatric vitamins are highly recommended  Vitamins are important for a life-time  Low levels can lead to deficiency which can lead to other medical problems  Exercise as tolerated    If you have gotten a lab slip at this visit, please note that most labs are FASTING-but you need to drink water the night before and the morning before your labs are done  It is HIGHLY RECOMMENDED that you check with your insurance to make sure all the labs ordered are covered by your individual insurance policy  This is especially important if you also get labs done by other providers outside of [facility]  You want to avoid having duplicate labs done  Note you will be given a lab slip AFTER  your annual visit next year to check your vitamin and mineral levels  You will not need to make a second appointment after the labs are received/reviewed  You will receive a letter/and or phone call with your results  Most labs do NOT honor a lab slip dated one year in advance now  Call our office if he have any problems with abdominal pain especially if associated with fever, chills, nausea, vomiting or any other concerns  All  Post-bariatric surgery patients should be aware that very small quantities of any alcohol  can cause impairment and it is very possible not to feel the effect  The effect can be in the system for several hours  It is also a stomach irritant  It is advised to AVOID alcohol, Nonsteroidal antiinflammatory drugs (NSAIDS) and nicotine of all forms    Any of these can cause stomach irritation/pain

## 2019-10-16 NOTE — ASSESSMENT & PLAN NOTE
By prior labs-she was advised on good food sources for this  Will await her repeat labs for further advice as needed

## 2019-10-16 NOTE — ASSESSMENT & PLAN NOTE
hgA1c of 5 8% by most recent labs -advised that some weight loss and avoidance of simple sugars may improve this level

## 2019-10-16 NOTE — ASSESSMENT & PLAN NOTE
Malabsorption- patient is at risk for malabsorption of vitamins/minerals secondary to malabsorption from her procedure and restriction of intakes  Reviewed current supplements and advised on same  She is taking one procare mvi with 18 mg of iron  And taking 500 mg calcium with D three per day  She is due for routine labs now

## 2019-10-16 NOTE — PROGRESS NOTES
Assessment/Plan:    Bariatric surgery status  -s/p Oren-En-Y Gastric Bypass with Dr Mateusz Galvez on 8/21/2017  Overall doing Fair  She is up 5 1/2 lb from mid September  She has been working with our medical weight management staff-feels discouraged -but notes "I'm not following all I need to do"-I encouraged her to follow advice and discuss struggles with medical weight management providers    At this time she is not interested in considering a revision to her surgery-she does report she feels appropriately restricted    Initial:330 8 lb  Current: 278 5 lb  EWL: 27%   [place]:247 5 lb  Current BMI is Body mass index is 50 61 kg/m²  Tolerating a regular diet-yes  Eating at least 60 grams of protein per day-yes  Following 30/60 minute rule with liquids-yes  Drinking at least 64 ounces of fluid per day-yes  Drinking carbonated beverages-no  Sufficient exercise-no  Using NSAIDs regularly-no  Using nicotine-no  Using alcohol-no    Colonsocopy/colon cancer screening is up-to-date as reported by patient  She reports she will be due for repeat in July 2020    Postgastrectomy malabsorption  Malabsorption- patient is at risk for malabsorption of vitamins/minerals secondary to malabsorption from her procedure and restriction of intakes  Reviewed current supplements and advised on same  She is taking one procare mvi with 18 mg of iron  And taking 500 mg calcium with D three per day  She is due for routine labs now    Morbid (severe) obesity due to excess calories ([address] )  She is working with our medical weight management providers to help her with her weight loss-feels discouraged but notes she is not following diet "like I should"-not really exercising  I encouraged her to do very short intervals of walking as tolerated or other exercises as tolerated in shorter increments  She does report she is out of a job and her mood is likely affecting her compliance as well   She is working with our  as well and encouraged same     She is up 5 lb from her September weight  Low serum vitamin A  By prior labs-she was advised on good food sources for this  Will await her repeat labs for further advice as needed  Heart burn  She was able to stop her ppi/heart burn controlled/resolved    Pure hypercholesterolemia  Worse from prior labs  This is being followed by her PCP  Weight loss could help/currently she is not on medication for this  Pre-diabetes  hgA1c of 5 8% by most recent labs -advised that some weight loss and avoidance of simple sugars may improve this level    Hypothyroidism due to acquired atrophy of thyroid  Followed by her PCP/on medication       Diagnoses and all orders for this visit:    Bariatric surgery status  -     Copper Level; Future  -     Ferritin; Future  -     Folate; Future  -     Iron Saturation %; Future  -     PTH, intact; Future  -     Vitamin A; Future  -     Vitamin B1, whole blood; Future  -     Vitamin B12; Future  -     Zinc; Future    Postgastrectomy malabsorption  -     Copper Level; Future  -     Ferritin; Future  -     Folate; Future  -     Iron Saturation %; Future  -     PTH, intact; Future  -     Vitamin A; Future  -     Vitamin B1, whole blood; Future  -     Vitamin B12; Future  -     Zinc; Future    Low serum vitamin A  -     Copper Level; Future  -     Ferritin; Future  -     Folate; Future  -     Iron Saturation %; Future  -     PTH, intact; Future  -     Vitamin A; Future  -     Vitamin B1, whole blood; Future  -     Vitamin B12; Future  -     Zinc; Future    Morbid (severe) obesity due to excess calories ([address] )    Heart burn    Pure hypercholesterolemia    Pre-diabetes    Hypothyroidism due to acquired atrophy of thyroid          Subjective:      Patient ID: Thania Horton is a 66 y o  female  She is here in routine follow-up  She is tolerating a regular diet  She is discouraged with diet and weight  Is working with our medical weight loss providers   Indicates she knows she is "not really following the diet"  She has joint issues and not regularly exercising but is doing some walking  She is taking bariatric supplements  She gained 5 lb back from last month  She notes she would not be interested in a revision to her surgery at this time  The following portions of the patient's history were reviewed and updated as appropriate: allergies, current medications, past family history, past medical history, past social history, past surgical history and problem list     Review of Systems   Constitutional: Positive for unexpected weight change (5 lb weight regain from September)  Negative for chills and fever  Respiratory: Negative for shortness of breath and wheezing  Cardiovascular: Negative for chest pain and palpitations  Gastrointestinal: Negative for abdominal pain, constipation, diarrhea, nausea and vomiting  Psychiatric/Behavioral: Negative for suicidal ideas (has had depressed mood/anxious-but overall appears to be coping ok)  Objective:      /80 (BP Location: Left arm, Patient Position: Sitting)   Pulse 97   Ht 5' 2 2" (1 58 m)   Wt 126 kg (278 lb 8 oz)   BMI 50 61 kg/m²          Physical Exam   Constitutional: She is oriented to person, place, and time  She appears well-developed and well-nourished  Morbidly obese   HENT:   Mouth/Throat: Oropharynx is clear and moist    Eyes: Conjunctivae are normal  No scleral icterus  Cardiovascular: Normal rate and regular rhythm  Pulmonary/Chest: Effort normal and breath sounds normal    Abdominal: Soft  There is no tenderness  Obese abdomen/ with + well healed incisional scars and right upper quadrant with well healed transverse scar   Musculoskeletal:   Normal gait   Neurological: She is alert and oriented to person, place, and time  Psychiatric: She has a normal mood and affect  Her behavior is normal  Judgment and thought content normal    Nursing note and vitals reviewed        GOALS: Continued weight loss with good nutrition intakes    Normal vitamin and mineral levels  Exercise as tolerated    BARRIERS: none identified

## 2019-10-22 ENCOUNTER — APPOINTMENT (OUTPATIENT)
Dept: LAB | Facility: IMAGING CENTER | Age: 59
End: 2019-10-22
Payer: COMMERCIAL

## 2019-10-22 DIAGNOSIS — Z98.84 BARIATRIC SURGERY STATUS: ICD-10-CM

## 2019-10-22 DIAGNOSIS — R79.89 LOW SERUM VITAMIN A: ICD-10-CM

## 2019-10-22 DIAGNOSIS — K91.2 POSTGASTRECTOMY MALABSORPTION: ICD-10-CM

## 2019-10-22 DIAGNOSIS — Z90.3 POSTGASTRECTOMY MALABSORPTION: ICD-10-CM

## 2019-10-22 LAB
FERRITIN SERPL-MCNC: 136 NG/ML (ref 8–388)
FOLATE SERPL-MCNC: >20 NG/ML (ref 3.1–17.5)
IRON SATN MFR SERPL: 27 %
IRON SERPL-MCNC: 80 UG/DL (ref 50–170)
PTH-INTACT SERPL-MCNC: 53.8 PG/ML (ref 18.4–80.1)
TIBC SERPL-MCNC: 293 UG/DL (ref 250–450)
VIT B12 SERPL-MCNC: 1252 PG/ML (ref 100–900)

## 2019-10-22 PROCEDURE — 84590 ASSAY OF VITAMIN A: CPT

## 2019-10-22 PROCEDURE — 82607 VITAMIN B-12: CPT

## 2019-10-22 PROCEDURE — 36415 COLL VENOUS BLD VENIPUNCTURE: CPT

## 2019-10-22 PROCEDURE — 83970 ASSAY OF PARATHORMONE: CPT

## 2019-10-22 PROCEDURE — 83550 IRON BINDING TEST: CPT

## 2019-10-22 PROCEDURE — 82746 ASSAY OF FOLIC ACID SERUM: CPT

## 2019-10-22 PROCEDURE — 84425 ASSAY OF VITAMIN B-1: CPT

## 2019-10-22 PROCEDURE — 82525 ASSAY OF COPPER: CPT

## 2019-10-22 PROCEDURE — 83540 ASSAY OF IRON: CPT

## 2019-10-22 PROCEDURE — 82728 ASSAY OF FERRITIN: CPT

## 2019-10-22 PROCEDURE — 84630 ASSAY OF ZINC: CPT

## 2019-10-24 LAB
COPPER SERPL-MCNC: 128 UG/DL (ref 72–166)
ZINC SERPL-MCNC: 89 UG/DL (ref 56–134)

## 2019-10-25 LAB
VIT A SERPL-MCNC: 37.6 UG/DL (ref 20.1–62)
VIT B1 BLD-SCNC: 142.8 NMOL/L (ref 66.5–200)

## 2020-01-01 NOTE — PROGRESS NOTES
Reviewed VLCD diet principles  Developed meal plan and reviewed product use  Ordered 2 week supply  Gave patient written VLCD education packet and left patient contact number  Will call contact patient in 2-3 days to assess tolerance  Walk in

## 2020-09-28 ENCOUNTER — OFFICE VISIT (OUTPATIENT)
Dept: BARIATRICS | Facility: CLINIC | Age: 60
End: 2020-09-28
Payer: COMMERCIAL

## 2020-09-28 VITALS
SYSTOLIC BLOOD PRESSURE: 140 MMHG | DIASTOLIC BLOOD PRESSURE: 96 MMHG | HEART RATE: 65 BPM | TEMPERATURE: 97.1 F | HEIGHT: 62 IN | WEIGHT: 293 LBS | BODY MASS INDEX: 53.92 KG/M2

## 2020-09-28 DIAGNOSIS — E03.4 HYPOTHYROIDISM DUE TO ACQUIRED ATROPHY OF THYROID: ICD-10-CM

## 2020-09-28 DIAGNOSIS — E03.9 HYPOTHYROIDISM, UNSPECIFIED TYPE: ICD-10-CM

## 2020-09-28 DIAGNOSIS — Z98.84 BARIATRIC SURGERY STATUS: ICD-10-CM

## 2020-09-28 DIAGNOSIS — E78.00 PURE HYPERCHOLESTEROLEMIA: ICD-10-CM

## 2020-09-28 DIAGNOSIS — K91.2 POSTSURGICAL MALABSORPTION: ICD-10-CM

## 2020-09-28 DIAGNOSIS — Z48.815 ENCOUNTER FOR SURGICAL AFTERCARE FOLLOWING SURGERY OF DIGESTIVE SYSTEM: ICD-10-CM

## 2020-09-28 DIAGNOSIS — I10 ESSENTIAL HYPERTENSION: ICD-10-CM

## 2020-09-28 DIAGNOSIS — E66.01 MORBID (SEVERE) OBESITY DUE TO EXCESS CALORIES (HCC): Primary | ICD-10-CM

## 2020-09-28 DIAGNOSIS — R73.03 PRE-DIABETES: ICD-10-CM

## 2020-09-28 PROBLEM — R79.89 LOW SERUM VITAMIN A: Status: RESOLVED | Noted: 2018-10-16 | Resolved: 2020-09-28

## 2020-09-28 PROCEDURE — 99214 OFFICE O/P EST MOD 30 MIN: CPT | Performed by: PHYSICIAN ASSISTANT

## 2020-09-28 NOTE — ASSESSMENT & PLAN NOTE
Worsened from bmi of 50 6 last October to current bmi of 54 1-she was following with our medical weight management providers and notes she still has 2 classes that she can go to with the weight management staff      Unfortunately she is losing her health insurance soon and medications aren't covered under her new upcoming policy-encouraged her to consider attending the classes -encouraged her to consider follow-up with RD/    A 24 hour diet recall was obtained from the patient and advised on diet

## 2020-09-28 NOTE — ASSESSMENT & PLAN NOTE
Being followed by PCP-she reports good hdl but with high LDL-advised on low fat diet and that continued weight loss can also help

## 2020-09-28 NOTE — PATIENT INSTRUCTIONS
It was great to see you again today  Schedule your follow-up medical classes and then follow-up with RD/ as desired and our medical weight management providers    Follow-up in one year  We kindly ask that you arrive 15 minutes before your scheduled appointment time with your provider to allow you to be roomed, have your vital signs checked and your chart updated by our staff  We thank you for your patience at your visit  Your appointment time is reserved only for you  If you are unable to make your scheduled visit, we would request that you call to cancel and reschedule it at your earliest convenience  Follow diet as discussed  Follow  vitamin and mineral recommendations as reviewed with you  Bariatric vitamins are highly recommended  Vitamins are important for a life-time  Low levels can lead to deficiency which can lead to other medical problems  Exercise as tolerated    If you have gotten a lab slip at this visit, please note that most labs are FASTING-but you need to drink water the night before and the morning before your labs are done  It is HIGHLY RECOMMENDED that you check with your insurance to make sure all the labs ordered are covered by your individual insurance policy  This is especially important if you also get labs done by other providers outside of [facility]  You want to avoid having duplicate labs done  Note you will be given a lab slip AFTER  your annual visit next year to check your vitamin and mineral levels  You will not need to make a second appointment after the labs are received/reviewed  You will receive a letter/and or phone call with your results  Most labs do NOT honor a lab slip dated one year in advance now  IMPORTANT if you have a [facility]" account, you will receive a letter of your vitamin/mineral results through the computer   Please watch for an update to your chart-since recommendations for supplement adjustments will be sent to you this way  Call our office if he have any problems with abdominal pain especially if associated with fever, chills, nausea, vomiting or any other concerns  All  Post-bariatric surgery patients should be aware that very small quantities of any alcohol  can cause impairment and it is very possible not to feel the effect  The effect can be in the system for several hours  It is also a stomach irritant  It is advised to AVOID alcohol, Nonsteroidal antiinflammatory drugs (NSAIDS) and nicotine of all forms   Any of these can cause stomach irritation/pain        Blood pressure -high in the office today-140/96-want level less than 140 and less than 90-this is very important for your heart issues to keep this level even lower -please review with PCP or cardiologist as we discussed

## 2020-09-28 NOTE — ASSESSMENT & PLAN NOTE
-s/p Vertical Sleeve Gastrectomy with Dr Roslyn Hoang on 8/21/2017  Overall doing Poor  Initial:330 8 lbCurrent:297 5 lb  EWL: 17%  [place]:247 5 lb  Current BMI is Body mass index is 54 06 kg/m²      Tolerating a regular diet-yes  Eating at least 60 grams of protein per day-yes  Following 30/60 minute rule with liquids-yes  Drinking at least 64 ounces of fluid per day-no-advised on ways to boost this and importance of same  Drinking carbonated beverages-no  Sufficient exercise-no/advised on importance of same and encouraged short intervals of same  Using NSAIDs regularly-no  Using nicotine-no  Using alcohol-yes/a glass of wine or beer once every 3-4 months-advised on effect and effect on weight      Colonsocopy/colon cancer screening is up-to-date as reported by patient

## 2020-09-28 NOTE — ASSESSMENT & PLAN NOTE
Malabsorption- patient is at risk for malabsorption of vitamins/minerals secondary to malabsorption from her procedure and restriction of intakes  Reviewed current supplements and advised on same    She is taking WooMe with either 18 or 45 mg of iron-alternates  And taking 500 mg calcium citrate with d three per day    She is due for routine labs now

## 2020-09-28 NOTE — ASSESSMENT & PLAN NOTE
On antihypertensive medication/reports she did take her dose this morning  /96 in the office today-advised to review results with PCP/cardiologist  She does not have a bp cuff at home-encouraged to get one as able/check when she is out at a pharmacy  -but advised this would need monitoring now  She was asymptomatic today    She verbalized understanding

## 2020-09-28 NOTE — PROGRESS NOTES
Assessment/Plan: Morbid (severe) obesity due to excess calories (HCC)  Worsened from bmi of 50 6 last October to current bmi of 54 1-she was following with our medical weight management providers and notes she still has 2 classes that she can go to with the weight management staff  Unfortunately she is losing her health insurance soon and medications aren't covered under her new upcoming policy-encouraged her to consider attending the classes -encouraged her to consider follow-up with RD/    A 24 hour diet recall was obtained from the patient and advised on diet    Encounter for surgical aftercare following surgery of digestive system  -s/p Vertical Sleeve Gastrectomy with Dr Neo Dumont on 8/21/2017  Overall doing Poor  Initial:330 8 lbCurrent:297 5 lb  EWL: 17%  [place]:247 5 lb  Current BMI is Body mass index is 54 06 kg/m²      Tolerating a regular diet-yes  Eating at least 60 grams of protein per day-yes  Following 30/60 minute rule with liquids-yes  Drinking at least 64 ounces of fluid per day-no-advised on ways to boost this and importance of same  Drinking carbonated beverages-no  Sufficient exercise-no/advised on importance of same and encouraged short intervals of same  Using NSAIDs regularly-no  Using nicotine-no  Using alcohol-yes/a glass of wine or beer once every 3-4 months-advised on effect and effect on weight      Colonsocopy/colon cancer screening is up-to-date as reported by patient      Postsurgical malabsorption  Malabsorption- patient is at risk for malabsorption of vitamins/minerals secondary to malabsorption from her procedure and restriction of intakes  Reviewed current supplements and advised on same    She is taking ClairMail with either 18 or 45 mg of iron-alternates  And taking 500 mg calcium citrate with d three per day    She is due for routine labs now    Pre-diabetes  Reports most recent lab of 5 7% -improved from 5 8%    Hypothyroid  Followed by pcp/on medication/most recent level looks good    Essential hypertension  On antihypertensive medication/reports she did take her dose this morning  /96 in the office today-advised to review results with PCP/cardiologist  She does not have a bp cuff at home-encouraged to get one as able/check when she is out at a pharmacy  -but advised this would need monitoring now  She was asymptomatic today    She verbalized understanding  Pure hypercholesterolemia  Being followed by PCP-she reports good hdl but with high LDL-advised on low fat diet and that continued weight loss can also help       Diagnoses and all orders for this visit:    Morbid (severe) obesity due to excess calories (HCC)  -     Ferritin; Future  -     Folate; Future  -     Iron Saturation %; Future  -     PTH, intact; Future  -     Vitamin A; Future  -     Vitamin B1, whole blood; Future  -     Vitamin B12; Future  -     Vitamin D 25 hydroxy; Future  -     Zinc; Future    Encounter for surgical aftercare following surgery of digestive system  -     Ferritin; Future  -     Folate; Future  -     Iron Saturation %; Future  -     PTH, intact; Future  -     Vitamin A; Future  -     Vitamin B1, whole blood; Future  -     Vitamin B12; Future  -     Vitamin D 25 hydroxy; Future  -     Zinc; Future    Postsurgical malabsorption  -     Ferritin; Future  -     Folate; Future  -     Iron Saturation %; Future  -     PTH, intact; Future  -     Vitamin A; Future  -     Vitamin B1, whole blood; Future  -     Vitamin B12; Future  -     Vitamin D 25 hydroxy; Future  -     Zinc; Future    Essential hypertension    Hypothyroidism due to acquired atrophy of thyroid  -     Ferritin; Future  -     Folate; Future  -     Iron Saturation %; Future  -     PTH, intact; Future  -     Vitamin A; Future  -     Vitamin B1, whole blood; Future  -     Vitamin B12; Future  -     Vitamin D 25 hydroxy; Future  -     Zinc; Future    Bariatric surgery status  -     Ferritin;  Future  - Folate; Future  -     Iron Saturation %; Future  -     PTH, intact; Future  -     Vitamin A; Future  -     Vitamin B1, whole blood; Future  -     Vitamin B12; Future  -     Vitamin D 25 hydroxy; Future  -     Zinc; Future    Pure hypercholesterolemia  -     Ferritin; Future  -     Folate; Future  -     Iron Saturation %; Future  -     PTH, intact; Future  -     Vitamin A; Future  -     Vitamin B1, whole blood; Future  -     Vitamin B12; Future  -     Vitamin D 25 hydroxy; Future  -     Zinc; Future    Pre-diabetes  -     Ferritin; Future  -     Folate; Future  -     Iron Saturation %; Future  -     PTH, intact; Future  -     Vitamin A; Future  -     Vitamin B1, whole blood; Future  -     Vitamin B12; Future  -     Vitamin D 25 hydroxy; Future  -     Zinc; Future    Hypothyroidism, unspecified type          Subjective:      Patient ID: Digna Pagan is a 64 y o  female  HPI    The following portions of the patient's history were reviewed and updated as appropriate: allergies, current medications, past family history, past medical history, past social history, past surgical history and problem list     Review of Systems   Constitutional: Positive for unexpected weight change (weight gain)  Negative for chills and fever  Respiratory: Negative for cough, shortness of breath and wheezing  Cardiovascular: Negative for chest pain and palpitations  Gastrointestinal: Negative for abdominal pain, constipation, diarrhea, nausea and vomiting  Neurological: Negative for numbness  Psychiatric/Behavioral: Negative for suicidal ideas ( complait of anxiety and  depression)           Objective:      /96 (BP Location: Right arm, Patient Position: Sitting)   Pulse 65   Temp (!) 97 1 °F (36 2 °C) (Temporal)   Ht 5' 2 2" (1 58 m)   Wt 135 kg (297 lb 8 oz)   BMI 54 06 kg/m²          Physical Exam  Constitutional:       Comments: Morbidly obese   Pulmonary:      Effort: Pulmonary effort is normal    Abdominal: Comments: Obese abdomen; incision sites appear faded; -well-healed incision sites and well-healed gallbladder scar   Neurological:      Mental Status: She is alert     Psychiatric:      Comments: Mood: a little down; affect: congruent

## 2020-10-22 ENCOUNTER — DOCUMENTATION (OUTPATIENT)
Dept: BARIATRICS | Facility: CLINIC | Age: 60
End: 2020-10-22

## 2020-10-22 PROCEDURE — RECHECK

## 2021-03-10 DIAGNOSIS — Z23 ENCOUNTER FOR IMMUNIZATION: ICD-10-CM

## 2021-10-01 ENCOUNTER — OFFICE VISIT (OUTPATIENT)
Dept: BARIATRICS | Facility: CLINIC | Age: 61
End: 2021-10-01
Payer: COMMERCIAL

## 2021-10-01 VITALS
WEIGHT: 293 LBS | HEIGHT: 62 IN | TEMPERATURE: 98.7 F | SYSTOLIC BLOOD PRESSURE: 124 MMHG | BODY MASS INDEX: 53.92 KG/M2 | HEART RATE: 89 BPM | DIASTOLIC BLOOD PRESSURE: 70 MMHG

## 2021-10-01 DIAGNOSIS — E03.9 HYPOTHYROIDISM, UNSPECIFIED TYPE: ICD-10-CM

## 2021-10-01 DIAGNOSIS — K91.2 POSTSURGICAL MALABSORPTION: ICD-10-CM

## 2021-10-01 DIAGNOSIS — R73.03 PRE-DIABETES: ICD-10-CM

## 2021-10-01 DIAGNOSIS — Z48.815 ENCOUNTER FOR SURGICAL AFTERCARE FOLLOWING SURGERY OF DIGESTIVE SYSTEM: Primary | ICD-10-CM

## 2021-10-01 DIAGNOSIS — I50.22 CHRONIC SYSTOLIC CHF (CONGESTIVE HEART FAILURE) (HCC): ICD-10-CM

## 2021-10-01 DIAGNOSIS — I48.19 PERSISTENT ATRIAL FIBRILLATION (HCC): ICD-10-CM

## 2021-10-01 DIAGNOSIS — Z98.84 BARIATRIC SURGERY STATUS: ICD-10-CM

## 2021-10-01 DIAGNOSIS — E66.01 MORBID (SEVERE) OBESITY DUE TO EXCESS CALORIES (HCC): ICD-10-CM

## 2021-10-01 DIAGNOSIS — I10 ESSENTIAL HYPERTENSION: ICD-10-CM

## 2021-10-01 PROCEDURE — 99214 OFFICE O/P EST MOD 30 MIN: CPT | Performed by: PHYSICIAN ASSISTANT

## 2021-10-01 RX ORDER — LEVOTHYROXINE SODIUM 0.03 MG/1
25 TABLET ORAL DAILY
COMMUNITY
Start: 2021-08-25

## 2021-10-08 ENCOUNTER — PREP FOR PROCEDURE (OUTPATIENT)
Dept: BARIATRICS | Facility: CLINIC | Age: 61
End: 2021-10-08

## 2021-10-08 DIAGNOSIS — Z98.84 BARIATRIC SURGERY STATUS: Primary | ICD-10-CM

## 2021-12-07 ENCOUNTER — TELEPHONE (OUTPATIENT)
Dept: GASTROENTEROLOGY | Facility: HOSPITAL | Age: 61
End: 2021-12-07

## 2021-12-08 ENCOUNTER — HOSPITAL ENCOUNTER (OUTPATIENT)
Dept: GASTROENTEROLOGY | Facility: HOSPITAL | Age: 61
Setting detail: OUTPATIENT SURGERY
Discharge: HOME | End: 2021-12-08
Attending: SURGERY | Admitting: SURGERY
Payer: COMMERCIAL

## 2021-12-08 ENCOUNTER — ANESTHESIA EVENT (OUTPATIENT)
Dept: GASTROENTEROLOGY | Facility: HOSPITAL | Age: 61
End: 2021-12-08

## 2021-12-08 ENCOUNTER — ANESTHESIA (OUTPATIENT)
Dept: GASTROENTEROLOGY | Facility: HOSPITAL | Age: 61
End: 2021-12-08

## 2021-12-08 VITALS
OXYGEN SATURATION: 95 % | HEIGHT: 62 IN | TEMPERATURE: 98.1 F | BODY MASS INDEX: 53.92 KG/M2 | WEIGHT: 293 LBS | RESPIRATION RATE: 20 BRPM | HEART RATE: 70 BPM | SYSTOLIC BLOOD PRESSURE: 120 MMHG | DIASTOLIC BLOOD PRESSURE: 73 MMHG

## 2021-12-08 DIAGNOSIS — Z98.84 BARIATRIC SURGERY STATUS: ICD-10-CM

## 2021-12-08 PROBLEM — E66.01 MORBID OBESITY WITH BMI OF 50.0-59.9, ADULT (HCC): Status: ACTIVE | Noted: 2021-12-08

## 2021-12-08 PROCEDURE — 43235 EGD DIAGNOSTIC BRUSH WASH: CPT | Performed by: SURGERY

## 2021-12-08 RX ORDER — SODIUM CHLORIDE 9 MG/ML
125 INJECTION, SOLUTION INTRAVENOUS CONTINUOUS
Status: DISCONTINUED | OUTPATIENT
Start: 2021-12-08 | End: 2021-12-12 | Stop reason: HOSPADM

## 2021-12-08 RX ADMIN — SODIUM CHLORIDE 125 ML/HR: 0.9 INJECTION, SOLUTION INTRAVENOUS at 09:04

## 2022-01-25 ENCOUNTER — TELEPHONE (OUTPATIENT)
Dept: BARIATRICS | Facility: CLINIC | Age: 62
End: 2022-01-25

## 2022-01-25 NOTE — TELEPHONE ENCOUNTER
Called pt to reschedule an appt that was cancelled on 1/21   Left office info to call back and reschedule

## 2023-09-22 NOTE — ASSESSMENT & PLAN NOTE
LDL is mildly high-continued weight loss may help-otherwise encouraged to review with PCP at routine visit for further management/if /as needed  Elidel Counseling: Patient may experience a mild burning sensation during topical application. Elidel is not approved in children less than 2 years of age. There have been case reports of hematologic and skin malignancies in patients using topical calcineurin inhibitors although causality is questionable.

## (undated) DEVICE — HARMONIC ACE +7 LAPAROSCOPIC SHEARS ADVANCED HEMOSTASIS 5MM DIAMETER 36CM SHAFT LENGTH  FOR USE WITH GRAY HAND PIECE ONLY: Brand: HARMONIC ACE

## (undated) DEVICE — ENDOPATH XCEL BLADELESS TROCARS WITH STABILITY SLEEVES: Brand: ENDOPATH XCEL

## (undated) DEVICE — SUT MONOCRYL 4-0 PS-2 27 IN Y426H

## (undated) DEVICE — VIOLET BRAIDED (POLYGLACTIN 910), SYNTHETIC ABSORBABLE SUTURE: Brand: COATED VICRYL

## (undated) DEVICE — SUT MONOCRYL 4-0 PS-2 18 IN Y496G

## (undated) DEVICE — CHLORAPREP HI-LITE 26ML ORANGE

## (undated) DEVICE — IRRIG ENDO FLO TUBING

## (undated) DEVICE — LIGACLIP 10-M/L, 10MM ENDOSCOPIC ROTATING MULTIPLE CLIP APPLIERS: Brand: LIGACLIP

## (undated) DEVICE — WEBRIL 6 IN UNSTERILE

## (undated) DEVICE — COVIDIEN ENDO GIA PURPLE (MED) RELOAD 45MM

## (undated) DEVICE — STRL UNIVERSAL MINOR GENERAL: Brand: CARDINAL HEALTH

## (undated) DEVICE — TRAVELKIT CONTAINS FIRST STEP KIT (200ML EP-4 KIT) AND SOILED SCOPE BAG - 1 KIT: Brand: TRAVELKIT CONTAINS FIRST STEP KIT AND SOILED SCOPE BAG

## (undated) DEVICE — 3000CC GUARDIAN II: Brand: GUARDIAN

## (undated) DEVICE — SEAMGUARD STPL REINF ENDO GIA ULTRA UNIV 60 PURPLE

## (undated) DEVICE — COVIDIEN ENDO GIA PURPLE (MED) RELOAD 60MM

## (undated) DEVICE — GLOVE SRG BIOGEL 7.5

## (undated) DEVICE — Device: Brand: DEFENDO AIR/WATER/SUCTION AND BIOPSY VALVE

## (undated) DEVICE — 2000CC GUARDIAN II: Brand: GUARDIAN

## (undated) DEVICE — INTENDED FOR TISSUE SEPARATION, AND OTHER PROCEDURES THAT REQUIRE A SHARP SURGICAL BLADE TO PUNCTURE OR CUT.: Brand: BARD-PARKER SAFETY BLADES SIZE 15, STERILE

## (undated) DEVICE — SYRINGE 30ML LL

## (undated) DEVICE — ADHESIVE SKN CLSR HISTOACRYL FLEX 0.5ML LF

## (undated) DEVICE — PMI DISPOSABLE PUNCTURE CLOSURE DEVICE / SUTURE GRASPER: Brand: PMI

## (undated) DEVICE — ANTI-FOG SOLUTION WITH FOAM PAD: Brand: DEVON

## (undated) DEVICE — STANDARD SURGICAL GOWN, L: Brand: CONVERTORS

## (undated) DEVICE — TIBURON LAPAROSCOPIC ABDOMINAL DRAPE: Brand: CONVERTORS

## (undated) DEVICE — COVIDIEN GIA BLACK (XTRA THICK) RELOAD

## (undated) DEVICE — PACK PBDS LAP CHOLE RF

## (undated) DEVICE — SEAMGUARD STPL REINF ENDO GIA ULTRA UNV 60 BLACK

## (undated) DEVICE — REM POLYHESIVE ADULT PATIENT RETURN ELECTRODE: Brand: VALLEYLAB

## (undated) DEVICE — GLOVE SRG BIOGEL 6.5

## (undated) DEVICE — GLOVE SRG BIOGEL ORTHOPEDIC 7.5

## (undated) DEVICE — GLOVE SRG BIOGEL 7

## (undated) DEVICE — ENDOPATH PNEUMONEEDLE INSUFFLATION NEEDLES WITH LUER LOCK CONNECTORS 120MM: Brand: ENDOPATH

## (undated) DEVICE — INTENDED FOR TISSUE SEPARATION, AND OTHER PROCEDURES THAT REQUIRE A SHARP SURGICAL BLADE TO PUNCTURE OR CUT.: Brand: BARD-PARKER SAFETY BLADES SIZE 11, STERILE

## (undated) DEVICE — GLOVE INDICATOR PI UNDERGLOVE SZ 6.5 BLUE

## (undated) DEVICE — TUBING SUCTION 5MM X 12 FT

## (undated) DEVICE — BLUE HEAT SCOPE WARMER

## (undated) DEVICE — GRASPER ATRAUMATIC FEN 5MM X 33CM ROTATING THUMB LOOP GENI-SURGE

## (undated) DEVICE — GLOVE INDICATOR PI UNDERGLOVE SZ 8 BLUE

## (undated) DEVICE — [HIGH FLOW INSUFFLATOR,  DO NOT USE IF PACKAGE IS DAMAGED,  KEEP DRY,  KEEP AWAY FROM SUNLIGHT,  PROTECT FROM HEAT AND RADIOACTIVE SOURCES.]: Brand: PNEUMOSURE

## (undated) DEVICE — SUT VICRYL 0 UR-6 27 IN J603H

## (undated) DEVICE — LAP AEM SCISSOR TIP .75 IN

## (undated) DEVICE — SURGICAL GOWN, XL SMARTSLEEVE: Brand: CONVERTORS

## (undated) DEVICE — GLOVE SRG BIOGEL 6

## (undated) DEVICE — NEEDLE 25GA X 1 IN SAFETY GLIDE

## (undated) DEVICE — URETERAL CATHETER ADAPTOR TIP

## (undated) DEVICE — TUBING SMOKE EVAC W/FILTRATION DEVICE PLUMEPORT ACTIV

## (undated) DEVICE — ENDOPATH 5MM ENDOSCOPIC BLUNT TIP DISSECTORS (12 POUCHES CONTAINING 3 DISSECTORS EACH): Brand: ENDOPATH

## (undated) DEVICE — NEEDLE SPINAL 22G X 3.5IN  QUINCKE

## (undated) DEVICE — SCD SEQUENTIAL COMPRESSION COMFORT SLEEVE MEDIUM KNEE LENGTH: Brand: KENDALL SCD

## (undated) DEVICE — LIGAMAX 5 MM ENDOSCOPIC MULTIPLE CLIP APPLIER: Brand: LIGAMAX

## (undated) DEVICE — AEM CORD

## (undated) DEVICE — ENDOPATH 5MM CURVED SCISSORS WITH MONOPOLAR CAUTERY: Brand: ENDOPATH

## (undated) DEVICE — ENDOPATH XCEL UNIVERSAL TROCAR STABLILITY SLEEVES: Brand: ENDOPATH XCEL

## (undated) DEVICE — GLOVE SRG BIOGEL 8

## (undated) DEVICE — ADHESIVE SKIN CLSR DERMABOND NX

## (undated) DEVICE — DRAPE TOWEL: Brand: CONVERTORS

## (undated) DEVICE — GLOVE INDICATOR UNDERGLOVE SZ 6 BLUE

## (undated) DEVICE — ASTOUND STANDARD SURGICAL GOWN, XL: Brand: CONVERTORS